# Patient Record
Sex: FEMALE | Race: WHITE | NOT HISPANIC OR LATINO | URBAN - METROPOLITAN AREA
[De-identification: names, ages, dates, MRNs, and addresses within clinical notes are randomized per-mention and may not be internally consistent; named-entity substitution may affect disease eponyms.]

---
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
## 2023-04-29 ENCOUNTER — OFFICE VISIT (OUTPATIENT)
Dept: URGENT CARE | Facility: CLINIC | Age: 23
End: 2023-04-29

## 2023-04-29 VITALS
DIASTOLIC BLOOD PRESSURE: 89 MMHG | OXYGEN SATURATION: 100 % | SYSTOLIC BLOOD PRESSURE: 132 MMHG | RESPIRATION RATE: 16 BRPM | TEMPERATURE: 98.3 F | HEART RATE: 78 BPM

## 2023-04-29 DIAGNOSIS — N30.00 ACUTE CYSTITIS WITHOUT HEMATURIA: ICD-10-CM

## 2023-04-29 DIAGNOSIS — R35.0 URINARY FREQUENCY: Primary | ICD-10-CM

## 2023-04-29 LAB
SL AMB  POCT GLUCOSE, UA: NEGATIVE
SL AMB LEUKOCYTE ESTERASE,UA: ABNORMAL
SL AMB POCT BILIRUBIN,UA: NEGATIVE
SL AMB POCT BLOOD,UA: NEGATIVE
SL AMB POCT CLARITY,UA: ABNORMAL
SL AMB POCT COLOR,UA: YELLOW
SL AMB POCT KETONES,UA: NEGATIVE
SL AMB POCT NITRITE,UA: NEGATIVE
SL AMB POCT PH,UA: 6.5
SL AMB POCT SPECIFIC GRAVITY,UA: 1
SL AMB POCT URINE PROTEIN: NEGATIVE
SL AMB POCT UROBILINOGEN: 0.2

## 2023-04-29 RX ORDER — NITROFURANTOIN 25; 75 MG/1; MG/1
100 CAPSULE ORAL 2 TIMES DAILY
Qty: 10 CAPSULE | Refills: 0 | Status: SHIPPED | OUTPATIENT
Start: 2023-04-29 | End: 2023-05-04

## 2023-04-29 NOTE — PROGRESS NOTES
[address] [facility]        NAME: Miranda Haro is a 23 y o  female  : 2000    MRN: [id number]  DATE: 2023  TIME: 10:29 AM    Assessment and Plan   Urinary frequency [R35 0]  1  Urinary frequency  POCT urine dip    Urine culture      2  Acute cystitis without hematuria  nitrofurantoin (MACROBID) 100 mg capsule            Patient Instructions     Check my chart for urine culture results  Start antibiotic and take as directed  Take probiotic  Drink plenty of fluids, water or cranberry juice  Avoid caffeine and alcohol  Tylenol or Motrin for pain or fever  Azo for bladder spasms  Follow up with PCP in 3-5 days  If you develop fever, flank pain, vomiting, abdominal pain, or any new or concerning symptoms please return or proceed to ER  Chief Complaint     Chief Complaint   Patient presents with   • Possible UTI     State for greater than 1 week  Has has intermittent episodes of increased frequency, cloudy urine, and ordor  Taking Urostat with some results as it improved the burning  History of Present Illness       The patient presents today with complaints of intermittent episodes of urinary frequency, burning, cloudy urine with odor x over 1 week  She has been taking urostat as needed with some improvement in symptoms  She denies fever/chills, flank/back pain, n/v/d, lower abdominal pain  She denies vaginal burning/itchiness, chance of pregnancy, or concern for STD exposure  Review of Systems   Review of Systems   Constitutional: Negative for chills, fatigue and fever  HENT: Negative for congestion  Eyes: Negative  Respiratory: Negative for cough and shortness of breath  Cardiovascular: Negative for chest pain and palpitations  Gastrointestinal: Negative for abdominal pain, diarrhea, nausea and vomiting  Genitourinary: Positive for dysuria and frequency   Negative for difficulty urinating, flank pain, pelvic pain, urgency, vaginal discharge and vaginal pain  Cloudy urine with odor   Musculoskeletal: Negative for back pain and myalgias  Skin: Negative for rash  Allergic/Immunologic: Negative for environmental allergies  Neurological: Negative for dizziness and headaches  Psychiatric/Behavioral: Negative  Current Medications       Current Outpatient Medications:   •  nitrofurantoin (MACROBID) 100 mg capsule, Take 1 capsule (100 mg total) by mouth 2 (two) times a day for 5 days, Disp: 10 capsule, Rfl: 0  •  Doxycycline Hyclate (DOXY-CAPS PO), Take by mouth (Patient not taking: Reported on 4/29/2023), Disp: , Rfl:     Current Allergies     Allergies as of 04/29/2023   • (No Known Allergies)            The following portions of the patient's history were reviewed and updated as appropriate: allergies, current medications, past family history, past medical history, past social history, past surgical history and problem list      Past Medical History:   Diagnosis Date   • Acne vulgaris        Past Surgical History:   Procedure Laterality Date   • NO PAST SURGERIES         Family History   Problem Relation Age of Onset   • No Known Problems Mother    • No Known Problems Father    • Multiple sclerosis Maternal Grandmother    • Heart disease Maternal Grandfather    • No Known Problems Paternal Grandmother    • No Known Problems Paternal Grandfather    • No Known Problems Sister    • Alcohol abuse Neg Hx    • Substance Abuse Neg Hx    • Mental illness Neg Hx          Medications have been verified  Objective   /89 (BP Location: Right arm, Patient Position: Sitting, Cuff Size: Standard)   Pulse 78   Temp 98 3 °F (36 8 °C) (Temporal)   Resp 16   LMP 04/10/2023 (Exact Date)   SpO2 100%        Physical Exam     Physical Exam  Vitals and nursing note reviewed  Constitutional:       General: She is not in acute distress  Appearance: Normal appearance  She is not ill-appearing  HENT:      Head: Normocephalic and atraumatic  Right Ear: External ear normal       Left Ear: External ear normal       Nose: Nose normal    Eyes:      General: Vision grossly intact  Extraocular Movements: Extraocular movements intact  Pupils: Pupils are equal, round, and reactive to light  Cardiovascular:      Rate and Rhythm: Normal rate and regular rhythm  Heart sounds: Normal heart sounds  No murmur heard  Pulmonary:      Effort: Pulmonary effort is normal  No respiratory distress  Breath sounds: Normal breath sounds  No decreased air movement  No decreased breath sounds, wheezing, rhonchi or rales  Abdominal:      General: Abdomen is flat  Bowel sounds are normal  There is no distension  Palpations: Abdomen is soft  Tenderness: There is no abdominal tenderness  Musculoskeletal:         General: Normal range of motion  Cervical back: Normal range of motion  Skin:     General: Skin is warm  Findings: No rash  Neurological:      Mental Status: She is alert and oriented to person, place, and time  Motor: Motor function is intact     Psychiatric:         Attention and Perception: Attention normal          Mood and Affect: Mood normal

## 2023-04-29 NOTE — PATIENT INSTRUCTIONS
Check my chart for urine culture results  Start antibiotic and take as directed  Take probiotic  Drink plenty of fluids, water or cranberry juice  Avoid caffeine and alcohol  Tylenol or Motrin for pain or fever  Azo for bladder spasms  Follow up with PCP in 3-5 days  If you develop fever, flank pain, vomiting, abdominal pain, or any new or concerning symptoms please return or proceed to ER  Urinary Tract Infection in Women   WHAT YOU NEED TO KNOW:   A urinary tract infection (UTI) is caused by bacteria that get inside your urinary tract  Most bacteria that enter your urinary tract come out when you urinate  If the bacteria stay in your urinary tract, you may get an infection  Your urinary tract includes your kidneys, ureters, bladder, and urethra  Urine is made in your kidneys, and it flows from the ureters to the bladder  Urine leaves the bladder through the urethra  A UTI is more common in your lower urinary tract, which includes your bladder and urethra  DISCHARGE INSTRUCTIONS:   Return to the emergency department if:   You are urinating very little or not at all  You have a high fever with shaking chills  You have side or back pain that gets worse  Contact your healthcare provider if:   You have a fever  You do not feel better after 2 days of taking antibiotics  You are vomiting  You have questions or concerns about your condition or care  Medicines:   Antibiotics  help fight a bacterial infection  Medicines  may be given to decrease pain and burning when you urinate  They will also help decrease the feeling that you need to urinate often  These medicines will make your urine orange or red  Take your medicine as directed  Contact your healthcare provider if you think your medicine is not helping or if you have side effects  Tell him or her if you are allergic to any medicine  Keep a list of the medicines, vitamins, and herbs you take   Include the amounts, and when and why you take them  Bring the list or the pill bottles to follow-up visits  Carry your medicine list with you in case of an emergency  Follow up with your healthcare provider as directed:  Write down your questions so you remember to ask them during your visits  Prevent another UTI:   Empty your bladder often  Urinate and empty your bladder as soon as you feel the need  Do not hold your urine for long periods of time  Wipe from front to back after you urinate or have a bowel movement  This will help prevent germs from getting into your urinary tract through your urethra  Drink liquids as directed  Ask how much liquid to drink each day and which liquids are best for you  You may need to drink more liquids than usual to help flush out the bacteria  Do not drink alcohol, caffeine, or citrus juices  These can irritate your bladder and increase your symptoms  Your healthcare provider may recommend cranberry juice to help prevent a UTI  Urinate after you have sex  This can help flush out bacteria passed during sex  Do not douche or use feminine deodorants  These can change the chemical balance in your vagina  Change sanitary pads or tampons often  This will help prevent germs from getting into your urinary tract  Do pelvic muscle exercises often  Pelvic muscle exercises may help you start and stop urinating  Strong pelvic muscles may help you empty your bladder easier  Squeeze these muscles tightly for 5 seconds like you are trying to hold back urine  Then relax for 5 seconds  Gradually work up to squeezing for 10 seconds  Do 3 sets of 15 repetitions a day, or as directed  © 2017 [address] Information is for End User's use only and may not be sold, redistributed or otherwise used for commercial purposes  All illustrations and images included in CareNotes® are the copyrighted property of [employer]  or Kaiser [employer]    The above information is an  only  It is not intended as medical advice for individual conditions or treatments  Talk to your doctor, nurse or pharmacist before following any medical regimen to see if it is safe and effective for you

## 2023-05-01 LAB — BACTERIA UR CULT: NORMAL

## 2023-05-31 NOTE — PROGRESS NOTES
Amie Copeland 2000 female MRN: [id number]      ASSESSMENT/PLAN  Problem List Items Addressed This Visit    None  Visit Diagnoses     Healthcare maintenance    -  Primary    Encounter for initial prescription of contraceptive pills        Relevant Medications    norgestimate-ethinyl estradiol (Sprintec 28) 0 25-35 MG-MCG per tablet    Screening for diabetes mellitus        Relevant Orders    Comprehensive metabolic panel    Screening, lipid        Relevant Orders    Lipid panel        Discussed trial of OTC allergy medication    Reviewed options for contraception including pill/ring/patch vs injectable vs Nexplanon/IUD  Pt would like to trial OCPs  Denies personal history of migraine, smoking, blood clot, liver disease, HTN  Reviewed possible ADRs including irregular bleeding, mood fluctuations, increased risk of clotting  Reviewed starting method  To call if not tolerating  Reviewed various options for management of anxiety including therapy and medication  Pt defers any services now, but is aware of in office LCSW should she like to schedule in future  BP WNL   CMP + Lipids to screen for HLD, DM   STD screening deferred per pt request   Vaccinations: HPV UTD, TDap UTD, COVID completed primary series   Encouraged regular physical activity, varied diet, and regular dental/eye exams     Future Appointments   Date Time Provider Aung Saldana   6/15/2023  9:00 AM JOHN [email or web address]   6/3/2024  9:00 AM DO YUNI Tobar  [facility]          SUBJECTIVE  CC: Establish Care      HPI:  Amie Copeland is a 23 y o  female who presents to establish care  History reviewed and updated as below  Would like to discuss contraception options   Wondering about who she could talk to about mental health -- notes anxiety, trouble focusing in school     Review of Systems   Constitutional: Negative for unexpected weight change     HENT: Positive for congestion, postnasal drip, rhinorrhea and sneezing  Negative for ear pain and sore throat          (+) allergies   Eyes: Negative for visual disturbance  Respiratory: Negative for cough and shortness of breath  Cardiovascular: Positive for palpitations (with anxiety)  Negative for chest pain and leg swelling  Gastrointestinal: Negative for abdominal pain, constipation and diarrhea  Endocrine: Negative for polyuria  Genitourinary: Negative for dysuria and menstrual problem  Neurological: Negative for dizziness and headaches  Psychiatric/Behavioral: Negative for sleep disturbance  The patient is nervous/anxious          Historical Information   The patient history was reviewed and updated as follows:    Past Medical History:   Diagnosis Date   • Acne vulgaris      Past Surgical History:   Procedure Laterality Date   • NO PAST SURGERIES       Family History   Problem Relation Age of Onset   • No Known Problems Mother    • No Known Problems Father    • Multiple sclerosis Maternal Grandmother    • Heart disease Maternal Grandfather    • No Known Problems Paternal Grandmother    • No Known Problems Paternal Grandfather    • No Known Problems Sister    • Alcohol abuse Neg Hx    • Substance Abuse Neg Hx    • Mental illness Neg Hx       Social History   Social History     Substance and Sexual Activity   Alcohol Use No     Social History     Substance and Sexual Activity   Drug Use No     Social History     Tobacco Use   Smoking Status Never   Smokeless Tobacco Never       Medications:     Current Outpatient Medications:   •  Multiple Vitamin (multivitamin) tablet, Take 1 tablet by mouth daily, Disp: , Rfl:   •  norgestimate-ethinyl estradiol (Sprintec 28) 0 25-35 MG-MCG per tablet, Take 1 tablet by mouth daily, Disp: 30 tablet, Rfl: 11  No Known Allergies    OBJECTIVE    Vitals:   Vitals:    06/01/23 0900 06/01/23 0925   BP: 136/86 122/76   Pulse: 88    Temp: (!) 97 1 °F (36 2 °C)    SpO2: 100%    Weight: 77 6 kg (171 lb)    Height: 5' "8\" (1 727 m)            Physical Exam  Vitals and nursing note reviewed  Constitutional:       General: She is not in acute distress  Appearance: Normal appearance  HENT:      Head: Normocephalic and atraumatic  Right Ear: Tympanic membrane, ear canal and external ear normal       Left Ear: Tympanic membrane, ear canal and external ear normal       Nose: Nose normal       Mouth/Throat:      Mouth: Mucous membranes are moist       Pharynx: No oropharyngeal exudate or posterior oropharyngeal erythema  Eyes:      Conjunctiva/sclera: Conjunctivae normal    Cardiovascular:      Rate and Rhythm: Normal rate and regular rhythm  Pulmonary:      Effort: Pulmonary effort is normal  No respiratory distress  Breath sounds: Normal breath sounds  Abdominal:      General: Bowel sounds are normal  There is no distension  Palpations: Abdomen is soft  Tenderness: There is no abdominal tenderness  Musculoskeletal:      Right lower leg: No edema  Left lower leg: No edema  Lymphadenopathy:      Cervical: No cervical adenopathy  Skin:     General: Skin is warm and dry  Neurological:      General: No focal deficit present  Mental Status: She is alert     Psychiatric:         Mood and Affect: Mood normal                     DO Aiyana R[address]as [address] [facility]   6/1/2023  9:32 AM    "

## 2023-06-01 ENCOUNTER — APPOINTMENT (OUTPATIENT)
Dept: LAB | Facility: CLINIC | Age: 23
End: 2023-06-01
Payer: COMMERCIAL

## 2023-06-01 ENCOUNTER — OFFICE VISIT (OUTPATIENT)
Dept: FAMILY MEDICINE CLINIC | Facility: CLINIC | Age: 23
End: 2023-06-01

## 2023-06-01 VITALS
HEIGHT: 68 IN | WEIGHT: 171 LBS | BODY MASS INDEX: 25.91 KG/M2 | TEMPERATURE: 97.1 F | OXYGEN SATURATION: 100 % | SYSTOLIC BLOOD PRESSURE: 122 MMHG | DIASTOLIC BLOOD PRESSURE: 76 MMHG | HEART RATE: 88 BPM

## 2023-06-01 DIAGNOSIS — Z13.1 SCREENING FOR DIABETES MELLITUS: ICD-10-CM

## 2023-06-01 DIAGNOSIS — Z13.220 SCREENING, LIPID: ICD-10-CM

## 2023-06-01 DIAGNOSIS — Z30.011 ENCOUNTER FOR INITIAL PRESCRIPTION OF CONTRACEPTIVE PILLS: ICD-10-CM

## 2023-06-01 DIAGNOSIS — Z00.00 HEALTHCARE MAINTENANCE: Primary | ICD-10-CM

## 2023-06-01 LAB
ALBUMIN SERPL BCP-MCNC: 4 G/DL (ref 3.5–5)
ALP SERPL-CCNC: 50 U/L (ref 46–116)
ALT SERPL W P-5'-P-CCNC: 18 U/L (ref 12–78)
ANION GAP SERPL CALCULATED.3IONS-SCNC: 0 MMOL/L (ref 4–13)
AST SERPL W P-5'-P-CCNC: 10 U/L (ref 5–45)
BILIRUB SERPL-MCNC: 0.36 MG/DL (ref 0.2–1)
BUN SERPL-MCNC: 10 MG/DL (ref 5–25)
CALCIUM SERPL-MCNC: 9.4 MG/DL (ref 8.3–10.1)
CHLORIDE SERPL-SCNC: 111 MMOL/L (ref 96–108)
CHOLEST SERPL-MCNC: 117 MG/DL
CO2 SERPL-SCNC: 25 MMOL/L (ref 21–32)
CREAT SERPL-MCNC: 0.99 MG/DL (ref 0.6–1.3)
GFR SERPL CREATININE-BSD FRML MDRD: 82 ML/MIN/1.73SQ M
GLUCOSE P FAST SERPL-MCNC: 89 MG/DL (ref 65–99)
HDLC SERPL-MCNC: 71 MG/DL
LDLC SERPL CALC-MCNC: 38 MG/DL (ref 0–100)
NONHDLC SERPL-MCNC: 46 MG/DL
POTASSIUM SERPL-SCNC: 4.2 MMOL/L (ref 3.5–5.3)
PROT SERPL-MCNC: 7.7 G/DL (ref 6.4–8.4)
SODIUM SERPL-SCNC: 136 MMOL/L (ref 135–147)
TRIGL SERPL-MCNC: 39 MG/DL

## 2023-06-01 PROCEDURE — 36415 COLL VENOUS BLD VENIPUNCTURE: CPT

## 2023-06-01 PROCEDURE — 80053 COMPREHEN METABOLIC PANEL: CPT

## 2023-06-01 PROCEDURE — 80061 LIPID PANEL: CPT

## 2023-06-01 RX ORDER — NORGESTIMATE AND ETHINYL ESTRADIOL 0.25-0.035
1 KIT ORAL DAILY
Qty: 30 TABLET | Refills: 11 | Status: SHIPPED | OUTPATIENT
Start: 2023-06-01

## 2023-06-01 RX ORDER — MULTIVITAMIN
1 TABLET ORAL DAILY
COMMUNITY

## 2023-06-15 ENCOUNTER — OFFICE VISIT (OUTPATIENT)
Age: 23
End: 2023-06-15
Payer: COMMERCIAL

## 2023-06-15 VITALS
SYSTOLIC BLOOD PRESSURE: 118 MMHG | HEIGHT: 68 IN | DIASTOLIC BLOOD PRESSURE: 78 MMHG | BODY MASS INDEX: 26.07 KG/M2 | WEIGHT: 172 LBS

## 2023-06-15 DIAGNOSIS — Z01.419 ENCOUNTER FOR GYNECOLOGICAL EXAMINATION WITHOUT ABNORMAL FINDING: Primary | ICD-10-CM

## 2023-06-15 DIAGNOSIS — Z11.3 SCREENING FOR STDS (SEXUALLY TRANSMITTED DISEASES): ICD-10-CM

## 2023-06-15 DIAGNOSIS — Z30.41 ENCOUNTER FOR SURVEILLANCE OF CONTRACEPTIVE PILLS: ICD-10-CM

## 2023-06-15 PROCEDURE — 87491 CHLMYD TRACH DNA AMP PROBE: CPT | Performed by: NURSE PRACTITIONER

## 2023-06-15 PROCEDURE — 99385 PREV VISIT NEW AGE 18-39: CPT | Performed by: NURSE PRACTITIONER

## 2023-06-15 PROCEDURE — 87591 N.GONORRHOEAE DNA AMP PROB: CPT | Performed by: NURSE PRACTITIONER

## 2023-06-15 RX ORDER — NORGESTIMATE AND ETHINYL ESTRADIOL 0.25-0.035
1 KIT ORAL DAILY
Qty: 30 TABLET | Refills: 11 | Status: SHIPPED | OUTPATIENT
Start: 2023-06-15

## 2023-06-15 NOTE — PROGRESS NOTES
Delmy was seen today for gynecologic exam     Diagnoses and all orders for this visit:    Encounter for gynecological examination without abnormal finding    Encounter for surveillance of contraceptive pills  -     norgestimate-ethinyl estradiol (Sprintec 28) 0 25-35 MG-MCG per tablet; Take 1 tablet by mouth daily    Screening for STDs (sexually transmitted diseases)  -     Chlamydia/GC amplified DNA by PCR      Calcium/vit d inclusion in the diet discussed, call with any issues, SBE reinforced, all concerns addressed  Encouraged safe sex  Pleasant 23 y o  NP premenopausal female here for annual exam  She denies any issues with bleeding or her menses  She reports regular cycles that last a few days  She just started ocp a few weeks ago for pregnancy prevention  GC/CT done today  She denies vaginal issues  Denies pelvic pain  She is doing well on her [facility]  All questions were addressed  Discussed ACHES  Gardasil series completed  Sexually active without any concerns      Past Medical History:   Diagnosis Date   • Acne vulgaris      Past Surgical History:   Procedure Laterality Date   • NO PAST SURGERIES       Family History   Problem Relation Age of Onset   • No Known Problems Mother    • No Known Problems Father    • Multiple sclerosis Maternal Grandmother    • Diabetes Maternal Grandmother    • Heart disease Maternal Grandfather    • No Known Problems Paternal Grandmother    • No Known Problems Paternal Grandfather    • No Known Problems Sister    • Alcohol abuse Neg Hx    • Substance Abuse Neg Hx    • Mental illness Neg Hx      Social History     Tobacco Use   • Smoking status: Never   • Smokeless tobacco: Never   Vaping Use   • Vaping Use: Never used   Substance Use Topics   • Alcohol use: No   • Drug use: No       Current Outpatient Medications:   •  norgestimate-ethinyl estradiol (Sprintec 28) 0 25-35 MG-MCG per tablet, Take 1 tablet by mouth daily, Disp: 30 tablet, Rfl: 11  •  Multiple Vitamin "(multivitamin) tablet, Take 1 tablet by mouth daily, Disp: , Rfl:   Patient Active Problem List    Diagnosis Date Noted   • Surveillance of contraceptive pill 06/15/2023       No Known Allergies    OB History    Para Term  AB Living   0 0 0 0 0 0   SAB IAB Ectopic Multiple Live Births   0 0 0 0 0     At [facility] [employer] [facility], trying for dental hygienist  Also works in a [employer] as a [address]:    06/15/23 0903   BP: 118/78   Weight: 78 kg (172 lb)   Height: 5' 8\" (1 727 m)     Body mass index is 26 15 kg/m²  Review of Systems   Constitutional: Negative for chills, fatigue, fever and unexpected weight change  Respiratory: Negative for shortness of breath  Gastrointestinal: Negative for anal bleeding, blood in stool, constipation and diarrhea  Genitourinary: Negative for difficulty urinating, dysuria and hematuria  Physical Exam   Constitutional: She appears well-developed and well-nourished  No distress  HENT: normal, no gross abnormalities noted  Head: Normocephalic  Neck: Normal range of motion  Neck supple  Pulmonary: Effort normal   Breasts: bilateral without masses, skin changes or nipple discharge  Bilaterally soft and warm to touch  No areas of erythema or pain  Abdominal: Soft  Pelvic exam was performed with patient supine  No labial fusion  There is no rash, tenderness, lesion or injury on the right labia  There is no rash, tenderness, lesion or injury on the left labia  Uterus is not deviated, not enlarged, not fixed and not tender  Cervix exhibits no motion tenderness, no discharge and no friability  Right adnexum displays no mass, no tenderness and no fullness  Left adnexum displays no mass, no tenderness and no fullness  No erythema or tenderness in the vagina  No foreign body in the vagina  No signs of injury around the vagina  No vaginal discharge found  Lymphadenopathy:        Right: No inguinal adenopathy present          Left: No inguinal " adenopathy present

## 2023-06-15 NOTE — PATIENT INSTRUCTIONS
Birth Control Pills   WHAT YOU NEED TO KNOW:   What are birth control pills? Birth control pills are also called oral contraceptives, or the pill  It is medicine that helps prevent pregnancy by stopping ovulation  Ovulation is when the ovaries make and release an egg cell each month  If this egg gets fertilized by sperm, pregnancy occurs  You will need to take the pill at the same time every day  Your healthcare provider will tell you when to start taking the pill  You will also be told what to do if you miss a dose  Instructions will depend on the kind of birth control pills you are taking  What are the different kinds of birth control pills? Some kinds are taken for 21 days in a row, followed by 7 days of placebo (no hormones) pills  Other kinds are taken for 24 days followed by 4 days of placebos  Each kind has a certain amount of female hormones  Your provider will decide on the kind that is best for you based on your age and other health conditions  What may be done before I can start taking birth control pills? You need to see your healthcare provider to get a prescription  Any of the following may be done before your healthcare provider gives you a prescription:  Your healthcare provider will ask about diseases and illnesses you have had in the past  Your provider will check your risk for blood clots, heart conditions, or stroke  Tell your provider if you had gastric bypass surgery  This surgery can affect the way your body absorbs medicines such as birth control pills  Your provider will also check your blood pressure, and may do a breast and pelvic exam  A Pap smear may also be done during the pelvic exam  This is a test to make sure you do not have abnormal changes on your cervix  You may need other tests, such as a urine test to make sure you are not pregnant  Your provider will ask if you take any medicines and if you smoke   Smoking increases your risk for stroke, heart attack, or a blood clot in your lungs  If you smoke, you should not take certain kinds of birth control pills  What are the advantages of birth control pills? When birth control pills are used correctly, the chances of getting pregnant are very low  Birth control pills may help decrease bleeding and pain during your monthly period  They may also help prevent cancer of the uterus and ovaries  What are the disadvantages of birth control pills? You may have sudden changes in your mood or feelings while you take birth control pills  You may have nausea and a decreased sex drive  You may have an increased appetite and rapid weight gain  You may also have bleeding in between periods, less frequent periods, vaginal dryness, and breast pain  Birth control pills will not protect you from sexually transmitted infections  Rarely, some birth control pills can increase your risk for a blood clot  This may become life-threatening  What should I do if I decide I want to get pregnant? If you are planning to have a baby, ask your healthcare provider when you may stop taking your birth control pills  It may take some time for you to start ovulating again  Ask your healthcare provider for more information about pregnancy after birth control pills  When should I start taking birth control pills after I have a baby? If you are not breastfeeding, you may start taking birth control pills 3 weeks after you give birth  You may be able to take certain types of birth control pills if you are breastfeeding  These pills can be started from 6 weeks to 6 months after you give birth  Ask your healthcare provider for more information about when to start taking birth control pills after you give birth  What do I need to know about birth control pills and menopause? Talk with your healthcare provider if you want to take birth control pills around menopause  Around age 39, you will enter into perimenopause   This means your hormone levels are dropping and you are ovulating less often  You can still become pregnant during this time  The risk for problems, such as miscarriage, are higher if you become pregnant after age 39  Birth control pills will prevent pregnancy, and may also help prevent or relieve some signs and symptoms of menopause  Examples are hot flashes and mood swings  Your provider will do tests when you are around age 48  The tests may show that you are in menopause  If the tests do not show menopause for sure, you may be able to continue taking the pill up to age 54  The decision will depend on your health and if you have any medical conditions, such as a blood clot  Call your local emergency number (911 in the [address]) for any of the following: You have any of the following signs of a stroke:      Numbness or drooping on one side of your face     Weakness in an arm or leg    Confusion or difficulty speaking    Dizziness, a severe headache, or vision loss    You feel lightheaded, short of breath, and have chest pain  You cough up blood  When should I seek immediate care? Your arm or leg feels warm, tender, and painful  It may look swollen and red  You have severe pain, numbness, or swelling in your arms or legs  When should I call my doctor? You have forgotten to take a birth control pill  You have mood changes, such as depression, since starting birth control pills  You have nausea or are vomiting  You have severe abdominal pain  You missed a period and have questions or concerns about being pregnant  You still have bleeding 4 months after taking birth control pills correctly  You have questions or concerns about your condition or care  CARE AGREEMENT:   You have the right to help plan your care  Learn about your health condition and how it may be treated  Discuss treatment options with your healthcare providers to decide what care you want to receive  You always have the right to refuse treatment   The above information is an  only  It is not intended as medical advice for individual conditions or treatments  Talk to your doctor, nurse or pharmacist before following any medical regimen to see if it is safe and effective for you  © Copyright [employer]ek[employer] 2022 Information is for End User's use only and may not be sold, redistributed or otherwise used for commercial purposes  Breast Self Exam for Women   AMBULATORY CARE:   A breast self-exam (BSE)  is a way to check your breasts for lumps and other changes  Regular BSEs can help you know how your breasts normally look and feel  Most breast lumps or changes are not cancer, but you should always have them checked by a healthcare provider  Why you should do a BSE:  Breast cancer is the most common type of cancer in women  Even if you have mammograms, you may still want to do a BSE regularly  If you know how your breasts normally feel and look, it may help you know when to contact your healthcare provider  Mammograms can miss some cancers  You may find a lump during a BSE that did not show up on a mammogram   When you should do a BSE:  If you have periods, you may want to do your BSE 1 week after your period ends  This is the time when your breasts may be the least swollen, lumpy, or tender  You can do regular BSEs even if you are breastfeeding or have breast implants  Call your doctor if:   You find any lumps or changes in your breasts  You have breast pain or fluid coming from your nipples  You have questions or concerns about your condition or care  How to do a BSE:       Look at your breasts in a mirror  Look at the size and shape of each breast and nipple  Check for swelling, lumps, dimpling, scaly skin, or other skin changes  Look for nipple changes, such as a nipple that is painful or beginning to pull inward  Gently squeeze both nipples and check to see if fluid (that is not breast milk) comes out of them   If you find any of these or other breast changes, contact your healthcare provider  Check your breasts while you sit or  the following 3 positions:    Benedict your arms down at your sides  Raise your hands and join them behind your head  Put firm pressure with your hands on your hips  Bend slightly forward while you look at your breasts in the mirror  Lie down and feel your breasts  When you lie down, your breast tissue spreads out evenly over your chest  This makes it easier for you to feel for lumps and anything that may not be normal for your breasts  Do a BSE on one breast at a time  Place a small pillow or towel under your left shoulder  Put your left arm behind your head  Use the 3 middle fingers of your right hand  Use your fingertip pads, on the top of your fingers  Your fingertip pad is the most sensitive part of your finger  Use small circles to feel your breast tissue  Use your fingertip pads to make dime-sized, overlapping circles on your breast and armpits  Use light, medium, and firm pressure  First, press lightly  Second, press with medium pressure to feel a little deeper into the breast  Last, use firm pressure to feel deep within your breast     Examine your entire breast area  Examine the breast area from above the breast to below the breast where you feel only ribs  Make small circles with your fingertips, starting in the middle of your armpit  Make circles going up and down the breast area  Continue toward your breast and all the way across it  Examine the area from your armpit all the way over to the middle of your chest (breastbone)  Stop at the middle of your chest     Move the pillow or towel to your right shoulder, and put your right arm behind your head  Use the 3 fingertip pads of your left hand, and repeat the above steps to do a BSE on your right breast   What else you can do to check for breast problems or cancer:  Talk to your healthcare provider about mammograms   A mammogram is an x-ray of your breasts to screen for breast cancer or other problems  Your provider can tell you the benefits and risks of mammograms  The first mammogram is usually at age 39 or 48  Your provider may recommend you start at 36 or younger if your risk for breast cancer is high  Mammograms usually continue every 1 to 2 years until age 76  Follow up with your doctor as directed:  Write down your questions so you remember to ask them during your visits  © Nickolas Cardenas 2022 Information is for End User's use only and may not be sold, redistributed or otherwise used for commercial purposes  The above information is an  only  It is not intended as medical advice for individual conditions or treatments  Talk to your doctor, nurse or pharmacist before following any medical regimen to see if it is safe and effective for you

## 2023-06-19 LAB
C TRACH DNA SPEC QL NAA+PROBE: NEGATIVE
N GONORRHOEA DNA SPEC QL NAA+PROBE: NEGATIVE

## 2023-08-29 ENCOUNTER — PATIENT MESSAGE (OUTPATIENT)
Age: 23
End: 2023-08-29

## 2024-01-01 ENCOUNTER — ANESTHESIA EVENT (INPATIENT)
Dept: PERIOP | Facility: HOSPITAL | Age: 24
DRG: 853 | End: 2024-01-01
Payer: COMMERCIAL

## 2024-01-01 ENCOUNTER — PREP FOR PROCEDURE (OUTPATIENT)
Dept: UROLOGY | Facility: AMBULATORY SURGERY CENTER | Age: 24
End: 2024-01-01

## 2024-01-01 ENCOUNTER — APPOINTMENT (EMERGENCY)
Dept: CT IMAGING | Facility: HOSPITAL | Age: 24
End: 2024-01-01
Payer: COMMERCIAL

## 2024-01-01 ENCOUNTER — ANESTHESIA (INPATIENT)
Dept: PERIOP | Facility: HOSPITAL | Age: 24
DRG: 853 | End: 2024-01-01
Payer: COMMERCIAL

## 2024-01-01 ENCOUNTER — HOSPITAL ENCOUNTER (EMERGENCY)
Facility: HOSPITAL | Age: 24
End: 2024-01-01
Attending: EMERGENCY MEDICINE
Payer: COMMERCIAL

## 2024-01-01 ENCOUNTER — HOSPITAL ENCOUNTER (INPATIENT)
Dept: RADIOLOGY | Facility: HOSPITAL | Age: 24
Discharge: HOME | DRG: 853 | End: 2024-01-01
Payer: COMMERCIAL

## 2024-01-01 ENCOUNTER — HOSPITAL ENCOUNTER (INPATIENT)
Facility: HOSPITAL | Age: 24
LOS: 3 days | Discharge: HOME | DRG: 853 | End: 2024-01-04
Attending: INTERNAL MEDICINE | Admitting: INTERNAL MEDICINE
Payer: COMMERCIAL

## 2024-01-01 VITALS
DIASTOLIC BLOOD PRESSURE: 56 MMHG | HEART RATE: 130 BPM | TEMPERATURE: 103.2 F | OXYGEN SATURATION: 95 % | SYSTOLIC BLOOD PRESSURE: 108 MMHG | RESPIRATION RATE: 22 BRPM

## 2024-01-01 DIAGNOSIS — A41.9 SEPTIC SHOCK (HCC): Primary | ICD-10-CM

## 2024-01-01 DIAGNOSIS — N20.0 KIDNEY STONE: ICD-10-CM

## 2024-01-01 DIAGNOSIS — N20.1 URETERAL STONE: ICD-10-CM

## 2024-01-01 DIAGNOSIS — D69.6 THROMBOCYTOPENIA (HCC): ICD-10-CM

## 2024-01-01 DIAGNOSIS — N20.1 RIGHT URETERAL STONE: Primary | ICD-10-CM

## 2024-01-01 DIAGNOSIS — N20.1 URETERAL STONE: Primary | ICD-10-CM

## 2024-01-01 DIAGNOSIS — N20.0 NEPHROLITHIASIS: ICD-10-CM

## 2024-01-01 DIAGNOSIS — A41.9 SEPSIS (HCC): ICD-10-CM

## 2024-01-01 DIAGNOSIS — R65.21 SEPTIC SHOCK (HCC): Primary | ICD-10-CM

## 2024-01-01 LAB
ALBUMIN SERPL BCP-MCNC: 4.7 G/DL (ref 3.5–5)
ALP SERPL-CCNC: 43 U/L (ref 34–104)
ALT SERPL W P-5'-P-CCNC: 14 U/L (ref 7–52)
ANION GAP SERPL CALCULATED.3IONS-SCNC: 13 MMOL/L
AST SERPL W P-5'-P-CCNC: 16 U/L (ref 13–39)
BACTERIA UR QL AUTO: ABNORMAL /HPF
BASOPHILS # BLD AUTO: 0.03 THOUSANDS/ÂΜL (ref 0–0.1)
BASOPHILS NFR BLD AUTO: 0 % (ref 0–1)
BILIRUB SERPL-MCNC: 1.23 MG/DL (ref 0.2–1)
BILIRUB UR QL STRIP: NEGATIVE
BUN SERPL-MCNC: 17 MG/DL (ref 5–25)
CALCIUM SERPL-MCNC: 9.4 MG/DL (ref 8.4–10.2)
CHLORIDE SERPL-SCNC: 105 MMOL/L (ref 96–108)
CLARITY UR: CLEAR
CO2 SERPL-SCNC: 21 MMOL/L (ref 21–32)
COLOR UR: ABNORMAL
CREAT SERPL-MCNC: 1.47 MG/DL (ref 0.6–1.3)
EOSINOPHIL # BLD AUTO: 0.01 THOUSAND/ÂΜL (ref 0–0.61)
EOSINOPHIL NFR BLD AUTO: 0 % (ref 0–6)
ERYTHROCYTE [DISTWIDTH] IN BLOOD BY AUTOMATED COUNT: 12.8 % (ref 11.6–15.1)
FLUAV RNA RESP QL NAA+PROBE: NEGATIVE
FLUBV RNA RESP QL NAA+PROBE: NEGATIVE
GFR SERPL CREATININE-BSD FRML MDRD: 50 ML/MIN/1.73SQ M
GLUCOSE SERPL-MCNC: 140 MG/DL (ref 65–140)
GLUCOSE UR STRIP-MCNC: NEGATIVE MG/DL
HCG SERPL QL: NEGATIVE
HCT VFR BLD AUTO: 40.9 % (ref 34.8–46.1)
HGB BLD-MCNC: 13.7 G/DL (ref 11.5–15.4)
HGB UR QL STRIP.AUTO: ABNORMAL
IMM GRANULOCYTES # BLD AUTO: 0.03 THOUSAND/UL (ref 0–0.2)
IMM GRANULOCYTES NFR BLD AUTO: 0 % (ref 0–2)
KETONES UR STRIP-MCNC: ABNORMAL MG/DL
LACTATE SERPL-SCNC: 1.6 MMOL/L (ref 0.5–2)
LACTATE SERPL-SCNC: 3.2 MMOL/L (ref 0.5–2)
LEUKOCYTE ESTERASE UR QL STRIP: ABNORMAL
LIPASE SERPL-CCNC: 6 U/L (ref 11–82)
LYMPHOCYTES # BLD AUTO: 0.38 THOUSANDS/ÂΜL (ref 0.6–4.47)
LYMPHOCYTES NFR BLD AUTO: 4 % (ref 14–44)
MCH RBC QN AUTO: 28.4 PG (ref 26.8–34.3)
MCHC RBC AUTO-ENTMCNC: 33.5 G/DL (ref 31.4–37.4)
MCV RBC AUTO: 85 FL (ref 82–98)
MONOCYTES # BLD AUTO: 0.06 THOUSAND/ÂΜL (ref 0.17–1.22)
MONOCYTES NFR BLD AUTO: 1 % (ref 4–12)
NEUTROPHILS # BLD AUTO: 10.17 THOUSANDS/ÂΜL (ref 1.85–7.62)
NEUTS SEG NFR BLD AUTO: 95 % (ref 43–75)
NITRITE UR QL STRIP: POSITIVE
NON-SQ EPI CELLS URNS QL MICRO: ABNORMAL /HPF
NRBC BLD AUTO-RTO: 0 /100 WBCS
PH UR STRIP.AUTO: 6 [PH]
PLATELET # BLD AUTO: 143 THOUSANDS/UL (ref 149–390)
PMV BLD AUTO: 11.7 FL (ref 8.9–12.7)
POTASSIUM SERPL-SCNC: 4.1 MMOL/L (ref 3.5–5.3)
PROT SERPL-MCNC: 7.7 G/DL (ref 6.4–8.4)
PROT UR STRIP-MCNC: ABNORMAL MG/DL
RBC # BLD AUTO: 4.82 MILLION/UL (ref 3.81–5.12)
RBC #/AREA URNS AUTO: ABNORMAL /HPF
RSV RNA RESP QL NAA+PROBE: NEGATIVE
SARS-COV-2 RNA RESP QL NAA+PROBE: NEGATIVE
SODIUM SERPL-SCNC: 139 MMOL/L (ref 135–147)
SP GR UR STRIP.AUTO: 1.05 (ref 1–1.03)
UROBILINOGEN UR STRIP-ACNC: <2 MG/DL
WBC # BLD AUTO: 10.68 THOUSAND/UL (ref 4.31–10.16)
WBC #/AREA URNS AUTO: ABNORMAL /HPF

## 2024-01-01 PROCEDURE — 36415 COLL VENOUS BLD VENIPUNCTURE: CPT | Performed by: EMERGENCY MEDICINE

## 2024-01-01 PROCEDURE — 87186 SC STD MICRODIL/AGAR DIL: CPT | Performed by: UROLOGY

## 2024-01-01 PROCEDURE — 87186 SC STD MICRODIL/AGAR DIL: CPT | Performed by: EMERGENCY MEDICINE

## 2024-01-01 PROCEDURE — 0241U HB NFCT DS VIR RESP RNA 4 TRGT: CPT | Performed by: EMERGENCY MEDICINE

## 2024-01-01 PROCEDURE — 96365 THER/PROPH/DIAG IV INF INIT: CPT

## 2024-01-01 PROCEDURE — 74420 UROGRAPHY RTRGR +-KUB: CPT

## 2024-01-01 PROCEDURE — 0T768DZ DILATION OF RIGHT URETER WITH INTRALUMINAL DEVICE, VIA NATURAL OR ARTIFICIAL OPENING ENDOSCOPIC: ICD-10-PCS | Performed by: UROLOGY

## 2024-01-01 PROCEDURE — 83690 ASSAY OF LIPASE: CPT | Performed by: EMERGENCY MEDICINE

## 2024-01-01 PROCEDURE — 93005 ELECTROCARDIOGRAM TRACING: CPT

## 2024-01-01 PROCEDURE — 96376 TX/PRO/DX INJ SAME DRUG ADON: CPT

## 2024-01-01 PROCEDURE — 83605 ASSAY OF LACTIC ACID: CPT | Performed by: EMERGENCY MEDICINE

## 2024-01-01 PROCEDURE — 81001 URINALYSIS AUTO W/SCOPE: CPT | Performed by: EMERGENCY MEDICINE

## 2024-01-01 PROCEDURE — 52332 CYSTOSCOPY AND TREATMENT: CPT | Performed by: UROLOGY

## 2024-01-01 PROCEDURE — 80053 COMPREHEN METABOLIC PANEL: CPT | Performed by: EMERGENCY MEDICINE

## 2024-01-01 PROCEDURE — 99284 EMERGENCY DEPT VISIT MOD MDM: CPT

## 2024-01-01 PROCEDURE — 87154 CUL TYP ID BLD PTHGN 6+ TRGT: CPT | Performed by: EMERGENCY MEDICINE

## 2024-01-01 PROCEDURE — 96361 HYDRATE IV INFUSION ADD-ON: CPT

## 2024-01-01 PROCEDURE — 87040 BLOOD CULTURE FOR BACTERIA: CPT | Performed by: EMERGENCY MEDICINE

## 2024-01-01 PROCEDURE — C1758 CATHETER, URETERAL: HCPCS | Performed by: UROLOGY

## 2024-01-01 PROCEDURE — 99291 CRITICAL CARE FIRST HOUR: CPT | Performed by: EMERGENCY MEDICINE

## 2024-01-01 PROCEDURE — 87077 CULTURE AEROBIC IDENTIFY: CPT | Performed by: EMERGENCY MEDICINE

## 2024-01-01 PROCEDURE — G1004 CDSM NDSC: HCPCS

## 2024-01-01 PROCEDURE — 96375 TX/PRO/DX INJ NEW DRUG ADDON: CPT

## 2024-01-01 PROCEDURE — 87086 URINE CULTURE/COLONY COUNT: CPT | Performed by: UROLOGY

## 2024-01-01 PROCEDURE — 99291 CRITICAL CARE FIRST HOUR: CPT | Performed by: INTERNAL MEDICINE

## 2024-01-01 PROCEDURE — 74176 CT ABD & PELVIS W/O CONTRAST: CPT

## 2024-01-01 PROCEDURE — 84703 CHORIONIC GONADOTROPIN ASSAY: CPT | Performed by: EMERGENCY MEDICINE

## 2024-01-01 PROCEDURE — 96367 TX/PROPH/DG ADDL SEQ IV INF: CPT

## 2024-01-01 PROCEDURE — C1769 GUIDE WIRE: HCPCS | Performed by: UROLOGY

## 2024-01-01 PROCEDURE — 74177 CT ABD & PELVIS W/CONTRAST: CPT

## 2024-01-01 PROCEDURE — 87077 CULTURE AEROBIC IDENTIFY: CPT | Performed by: UROLOGY

## 2024-01-01 PROCEDURE — 87086 URINE CULTURE/COLONY COUNT: CPT | Performed by: EMERGENCY MEDICINE

## 2024-01-01 PROCEDURE — C2617 STENT, NON-COR, TEM W/O DEL: HCPCS | Performed by: UROLOGY

## 2024-01-01 PROCEDURE — 85025 COMPLETE CBC W/AUTO DIFF WBC: CPT | Performed by: EMERGENCY MEDICINE

## 2024-01-01 DEVICE — STENT URETERAL 6FR 24CM INLAY OPTIMA: Type: IMPLANTABLE DEVICE | Status: FUNCTIONAL

## 2024-01-01 RX ORDER — CEFAZOLIN SODIUM 2 G/50ML
2000 SOLUTION INTRAVENOUS ONCE
OUTPATIENT
Start: 2024-01-01 | End: 2024-01-01

## 2024-01-01 RX ORDER — CEFAZOLIN SODIUM 2 G/50ML
2000 SOLUTION INTRAVENOUS
Status: CANCELLED | OUTPATIENT
Start: 2024-01-02 | End: 2024-01-03

## 2024-01-01 RX ORDER — FENTANYL CITRATE/PF 50 MCG/ML
50 SYRINGE (ML) INJECTION
Status: DISCONTINUED | OUTPATIENT
Start: 2024-01-01 | End: 2024-01-01 | Stop reason: HOSPADM

## 2024-01-01 RX ORDER — FENTANYL CITRATE 50 UG/ML
INJECTION, SOLUTION INTRAMUSCULAR; INTRAVENOUS
Status: COMPLETED
Start: 2024-01-01 | End: 2024-01-01

## 2024-01-01 RX ORDER — ONDANSETRON 2 MG/ML
4 INJECTION INTRAMUSCULAR; INTRAVENOUS ONCE AS NEEDED
Status: DISCONTINUED | OUTPATIENT
Start: 2024-01-01 | End: 2024-01-01 | Stop reason: HOSPADM

## 2024-01-01 RX ORDER — SODIUM CHLORIDE 9 MG/ML
INJECTION, SOLUTION INTRAVENOUS CONTINUOUS PRN
Status: DISCONTINUED | OUTPATIENT
Start: 2024-01-01 | End: 2024-01-01

## 2024-01-01 RX ORDER — CHLORHEXIDINE GLUCONATE ORAL RINSE 1.2 MG/ML
15 SOLUTION DENTAL EVERY 12 HOURS SCHEDULED
Status: DISCONTINUED | OUTPATIENT
Start: 2024-01-01 | End: 2024-01-02

## 2024-01-01 RX ORDER — MORPHINE SULFATE 4 MG/ML
4 INJECTION, SOLUTION INTRAMUSCULAR; INTRAVENOUS ONCE
Status: COMPLETED | OUTPATIENT
Start: 2024-01-01 | End: 2024-01-01

## 2024-01-01 RX ORDER — DEXAMETHASONE SODIUM PHOSPHATE 10 MG/ML
INJECTION, SOLUTION INTRAMUSCULAR; INTRAVENOUS AS NEEDED
Status: DISCONTINUED | OUTPATIENT
Start: 2024-01-01 | End: 2024-01-01

## 2024-01-01 RX ORDER — CEFTRIAXONE 1 G/50ML
1000 INJECTION, SOLUTION INTRAVENOUS ONCE
Status: COMPLETED | OUTPATIENT
Start: 2024-01-01 | End: 2024-01-01

## 2024-01-01 RX ORDER — CEFEPIME HYDROCHLORIDE 2 G/50ML
2000 INJECTION, SOLUTION INTRAVENOUS ONCE
Status: COMPLETED | OUTPATIENT
Start: 2024-01-01 | End: 2024-01-01

## 2024-01-01 RX ORDER — SODIUM CHLORIDE, SODIUM GLUCONATE, SODIUM ACETATE, POTASSIUM CHLORIDE, MAGNESIUM CHLORIDE, SODIUM PHOSPHATE, DIBASIC, AND POTASSIUM PHOSPHATE .53; .5; .37; .037; .03; .012; .00082 G/100ML; G/100ML; G/100ML; G/100ML; G/100ML; G/100ML; G/100ML
1000 INJECTION, SOLUTION INTRAVENOUS ONCE
Status: COMPLETED | OUTPATIENT
Start: 2024-01-01 | End: 2024-01-01

## 2024-01-01 RX ORDER — HYDROMORPHONE HCL/PF 1 MG/ML
0.5 SYRINGE (ML) INJECTION
Status: DISCONTINUED | OUTPATIENT
Start: 2024-01-01 | End: 2024-01-01 | Stop reason: HOSPADM

## 2024-01-01 RX ORDER — SODIUM CHLORIDE 9 MG/ML
125 INJECTION, SOLUTION INTRAVENOUS CONTINUOUS
Status: DISCONTINUED | OUTPATIENT
Start: 2024-01-01 | End: 2024-01-02

## 2024-01-01 RX ORDER — FENTANYL CITRATE 50 UG/ML
INJECTION, SOLUTION INTRAMUSCULAR; INTRAVENOUS AS NEEDED
Status: DISCONTINUED | OUTPATIENT
Start: 2024-01-01 | End: 2024-01-01

## 2024-01-01 RX ORDER — PROPOFOL 10 MG/ML
INJECTION, EMULSION INTRAVENOUS AS NEEDED
Status: DISCONTINUED | OUTPATIENT
Start: 2024-01-01 | End: 2024-01-01

## 2024-01-01 RX ORDER — HEPARIN SODIUM 5000 [USP'U]/ML
5000 INJECTION, SOLUTION INTRAVENOUS; SUBCUTANEOUS EVERY 8 HOURS SCHEDULED
Status: DISCONTINUED | OUTPATIENT
Start: 2024-01-01 | End: 2024-01-03

## 2024-01-01 RX ORDER — ACETAMINOPHEN 325 MG/1
975 TABLET ORAL ONCE
Status: COMPLETED | OUTPATIENT
Start: 2024-01-01 | End: 2024-01-01

## 2024-01-01 RX ORDER — CEFTRIAXONE 1 G/50ML
1000 INJECTION, SOLUTION INTRAVENOUS EVERY 24 HOURS
Status: DISCONTINUED | OUTPATIENT
Start: 2024-01-02 | End: 2024-01-01

## 2024-01-01 RX ORDER — ONDANSETRON 2 MG/ML
INJECTION INTRAMUSCULAR; INTRAVENOUS AS NEEDED
Status: DISCONTINUED | OUTPATIENT
Start: 2024-01-01 | End: 2024-01-01

## 2024-01-01 RX ORDER — CEFAZOLIN SODIUM 2 G/50ML
2000 SOLUTION INTRAVENOUS
Status: DISCONTINUED | OUTPATIENT
Start: 2024-01-02 | End: 2024-01-01 | Stop reason: HOSPADM

## 2024-01-01 RX ORDER — SUCCINYLCHOLINE/SOD CL,ISO/PF 100 MG/5ML
SYRINGE (ML) INTRAVENOUS AS NEEDED
Status: DISCONTINUED | OUTPATIENT
Start: 2024-01-01 | End: 2024-01-01

## 2024-01-01 RX ORDER — LIDOCAINE HYDROCHLORIDE 20 MG/ML
INJECTION, SOLUTION EPIDURAL; INFILTRATION; INTRACAUDAL; PERINEURAL AS NEEDED
Status: DISCONTINUED | OUTPATIENT
Start: 2024-01-01 | End: 2024-01-01

## 2024-01-01 RX ORDER — KETOROLAC TROMETHAMINE 30 MG/ML
15 INJECTION, SOLUTION INTRAMUSCULAR; INTRAVENOUS ONCE
Status: COMPLETED | OUTPATIENT
Start: 2024-01-01 | End: 2024-01-01

## 2024-01-01 RX ORDER — MAGNESIUM HYDROXIDE 1200 MG/15ML
LIQUID ORAL AS NEEDED
Status: DISCONTINUED | OUTPATIENT
Start: 2024-01-01 | End: 2024-01-01 | Stop reason: HOSPADM

## 2024-01-01 RX ORDER — CEFTRIAXONE 2 G/50ML
2000 INJECTION, SOLUTION INTRAVENOUS EVERY 24 HOURS
Status: DISCONTINUED | OUTPATIENT
Start: 2024-01-02 | End: 2024-01-04 | Stop reason: HOSPADM

## 2024-01-01 RX ORDER — MORPHINE SULFATE 4 MG/ML
4 INJECTION, SOLUTION INTRAMUSCULAR; INTRAVENOUS EVERY 4 HOURS PRN
Status: DISCONTINUED | OUTPATIENT
Start: 2024-01-01 | End: 2024-01-02

## 2024-01-01 RX ORDER — ONDANSETRON 2 MG/ML
4 INJECTION INTRAMUSCULAR; INTRAVENOUS ONCE
Status: COMPLETED | OUTPATIENT
Start: 2024-01-01 | End: 2024-01-01

## 2024-01-01 RX ADMIN — FENTANYL CITRATE 50 MCG: 0.05 INJECTION, SOLUTION INTRAMUSCULAR; INTRAVENOUS at 23:41

## 2024-01-01 RX ADMIN — PHENYLEPHRINE HYDROCHLORIDE 20 MCG/MIN: 10 INJECTION INTRAVENOUS at 22:37

## 2024-01-01 RX ADMIN — SODIUM CHLORIDE 1340 ML: 0.9 INJECTION, SOLUTION INTRAVENOUS at 18:56

## 2024-01-01 RX ADMIN — SODIUM CHLORIDE, SODIUM GLUCONATE, SODIUM ACETATE, POTASSIUM CHLORIDE, MAGNESIUM CHLORIDE, SODIUM PHOSPHATE, DIBASIC, AND POTASSIUM PHOSPHATE 1000 ML: .53; .5; .37; .037; .03; .012; .00082 INJECTION, SOLUTION INTRAVENOUS at 23:16

## 2024-01-01 RX ADMIN — ONDANSETRON 4 MG: 2 INJECTION INTRAMUSCULAR; INTRAVENOUS at 16:25

## 2024-01-01 RX ADMIN — KETOROLAC TROMETHAMINE 15 MG: 30 INJECTION, SOLUTION INTRAMUSCULAR at 16:26

## 2024-01-01 RX ADMIN — CHLORHEXIDINE GLUCONATE 0.12% ORAL RINSE 15 ML: 1.2 LIQUID ORAL at 23:52

## 2024-01-01 RX ADMIN — FENTANYL CITRATE 50 MCG: 50 INJECTION INTRAMUSCULAR; INTRAVENOUS at 22:24

## 2024-01-01 RX ADMIN — MORPHINE SULFATE 4 MG: 4 INJECTION INTRAVENOUS at 18:40

## 2024-01-01 RX ADMIN — ACETAMINOPHEN 975 MG: 325 TABLET, FILM COATED ORAL at 18:39

## 2024-01-01 RX ADMIN — ONDANSETRON 4 MG: 2 INJECTION INTRAMUSCULAR; INTRAVENOUS at 22:31

## 2024-01-01 RX ADMIN — Medication 90 MG: at 22:26

## 2024-01-01 RX ADMIN — DEXAMETHASONE SODIUM PHOSPHATE 5 MG: 10 INJECTION INTRAMUSCULAR; INTRAVENOUS at 22:31

## 2024-01-01 RX ADMIN — CEFTRIAXONE 1000 MG: 1 INJECTION, SOLUTION INTRAVENOUS at 17:24

## 2024-01-01 RX ADMIN — LIDOCAINE HYDROCHLORIDE 40 MG: 20 INJECTION, SOLUTION EPIDURAL; INFILTRATION; INTRACAUDAL at 22:26

## 2024-01-01 RX ADMIN — Medication 50 MCG: at 23:41

## 2024-01-01 RX ADMIN — NOREPINEPHRINE BITARTRATE 4 MCG/MIN: 1 INJECTION, SOLUTION, CONCENTRATE INTRAVENOUS at 23:26

## 2024-01-01 RX ADMIN — SODIUM CHLORIDE: 0.9 INJECTION, SOLUTION INTRAVENOUS at 22:09

## 2024-01-01 RX ADMIN — SODIUM CHLORIDE 125 ML/HR: 0.9 INJECTION, SOLUTION INTRAVENOUS at 23:48

## 2024-01-01 RX ADMIN — SODIUM CHLORIDE 1000 ML: 0.9 INJECTION, SOLUTION INTRAVENOUS at 16:20

## 2024-01-01 RX ADMIN — VANCOMYCIN HYDROCHLORIDE 1500 MG: 5 INJECTION, POWDER, LYOPHILIZED, FOR SOLUTION INTRAVENOUS at 20:38

## 2024-01-01 RX ADMIN — MORPHINE SULFATE 4 MG: 4 INJECTION INTRAVENOUS at 16:27

## 2024-01-01 RX ADMIN — SODIUM CHLORIDE: 0.9 INJECTION, SOLUTION INTRAVENOUS at 22:45

## 2024-01-01 RX ADMIN — IOHEXOL 100 ML: 350 INJECTION, SOLUTION INTRAVENOUS at 17:10

## 2024-01-01 RX ADMIN — HEPARIN SODIUM 5000 UNITS: 5000 INJECTION INTRAVENOUS; SUBCUTANEOUS at 23:53

## 2024-01-01 RX ADMIN — CEFEPIME HYDROCHLORIDE 2000 MG: 2 INJECTION, SOLUTION INTRAVENOUS at 20:00

## 2024-01-01 RX ADMIN — PROPOFOL 180 MG: 10 INJECTION, EMULSION INTRAVENOUS at 22:26

## 2024-01-01 NOTE — ED PROVIDER NOTES
History  Chief Complaint   Patient presents with    Back Pain     Pt c/o lower back pain and vomiting that started on Thursday      Patient is a 23-year-old female no past medical history presenting with right-sided low back pain.  Patient states for last 4 days she has had intermittent nonradiating right flank pain associated with multiple episodes today of vomiting, chills.  Has been taking ibuprofen with some relief last dose was 8 hours ago.  Has been using ice and heat with no relief.  Denies any cough or fevers, vaginal bleeding, notes normal vaginal discharge which is unchanged, denies any urinary symptoms, chest pain, shortness of breath, rashes, vision changes.  Does note lightheadedness as well as nasal congestion.        Prior to Admission Medications   Prescriptions Last Dose Informant Patient Reported? Taking?   Multiple Vitamin (multivitamin) tablet   Yes Yes   Sig: Take 1 tablet by mouth daily   norgestimate-ethinyl estradiol (Sprintec 28) 0.25-35 MG-MCG per tablet   No Yes   Sig: Take 1 tablet by mouth daily      Facility-Administered Medications: None       Past Medical History:   Diagnosis Date    Acne vulgaris        Past Surgical History:   Procedure Laterality Date    NO PAST SURGERIES         Family History   Problem Relation Age of Onset    No Known Problems Mother     No Known Problems Father     Multiple sclerosis Maternal Grandmother     Diabetes Maternal Grandmother     Heart disease Maternal Grandfather     No Known Problems Paternal Grandmother     No Known Problems Paternal Grandfather     No Known Problems Sister     Alcohol abuse Neg Hx     Substance Abuse Neg Hx     Mental illness Neg Hx      I have reviewed and agree with the history as documented.    E-Cigarette/Vaping    E-Cigarette Use Current Some Day User      E-Cigarette/Vaping Substances    Nicotine Yes     THC No     CBD No     Flavoring Yes      Social History     Tobacco Use    Smoking status: Some Days     Types:  Cigarettes    Smokeless tobacco: Never   Vaping Use    Vaping status: Some Days    Substances: Nicotine, Flavoring   Substance Use Topics    Alcohol use: Yes     Alcohol/week: 2.0 standard drinks of alcohol     Types: 2 Standard drinks or equivalent per week    Drug use: No       Review of Systems   All other systems reviewed and are negative.      Physical Exam  Physical Exam  Vitals reviewed.   Constitutional:       General: She is not in acute distress.     Appearance: Normal appearance. She is ill-appearing.   HENT:      Mouth/Throat:      Mouth: Mucous membranes are moist.   Eyes:      Conjunctiva/sclera: Conjunctivae normal.   Cardiovascular:      Rate and Rhythm: Normal rate and regular rhythm.      Pulses: Normal pulses.      Heart sounds: Normal heart sounds.   Pulmonary:      Effort: Pulmonary effort is normal.      Breath sounds: Normal breath sounds.   Abdominal:      General: Abdomen is flat.      Palpations: Abdomen is soft.      Tenderness: There is abdominal tenderness. There is right CVA tenderness. There is no left CVA tenderness or guarding.      Comments: Mild right lower quadrant tenderness without guarding, right CVA tenderness   Musculoskeletal:         General: No swelling. Normal range of motion.      Cervical back: Neck supple.      Right lower leg: No edema.      Left lower leg: No edema.   Skin:     General: Skin is warm and dry.   Neurological:      General: No focal deficit present.      Mental Status: She is alert.   Psychiatric:         Mood and Affect: Mood normal.         Vital Signs  ED Triage Vitals   Temperature Pulse Respirations Blood Pressure SpO2   01/01/24 1509 01/01/24 1509 01/01/24 1509 01/01/24 1509 01/01/24 1509   97.9 °F (36.6 °C) (!) 119 18 132/98 100 %      Temp Source Heart Rate Source Patient Position - Orthostatic VS BP Location FiO2 (%)   01/01/24 1509 01/01/24 1509 01/01/24 1509 01/01/24 1509 --   Tympanic Monitor Sitting Left arm       Pain Score       01/01/24  1626       9           Vitals:    01/01/24 1509 01/01/24 1831 01/01/24 1931 01/01/24 2029   BP: 132/98 170/78 115/57 108/56   Pulse: (!) 119 (!) 150 (!) 133 (!) 130   Patient Position - Orthostatic VS: Sitting Lying Sitting Sitting         Visual Acuity      ED Medications  Medications   sodium chloride 0.9 % bolus 1,000 mL (0 mL Intravenous Stopped 1/1/24 1720)   ondansetron (ZOFRAN) injection 4 mg (4 mg Intravenous Given 1/1/24 1625)   ketorolac (TORADOL) injection 15 mg (15 mg Intravenous Given 1/1/24 1626)   morphine injection 4 mg (4 mg Intravenous Given 1/1/24 1627)   cefTRIAXone (ROCEPHIN) IVPB (premix in dextrose) 1,000 mg 50 mL (0 mg Intravenous Stopped 1/1/24 1754)   iohexol (OMNIPAQUE) 350 MG/ML injection (MULTI-DOSE) 100 mL (100 mL Intravenous Given 1/1/24 1710)   morphine injection 4 mg (4 mg Intravenous Given 1/1/24 1840)   acetaminophen (TYLENOL) tablet 975 mg (975 mg Oral Given 1/1/24 1839)   sodium chloride 0.9 % bolus 1,340 mL (0 mL Intravenous Stopped 1/1/24 1956)   cefepime (MAXIPIME) IVPB (premix in dextrose) 2,000 mg 50 mL (0 mg Intravenous Stopped 1/1/24 2030)       Diagnostic Studies  Results Reviewed       Procedure Component Value Units Date/Time    Lactic acid 2 Hours [[id number]]  (Normal) Collected: 01/01/24 1938    Lab Status: Final result Specimen: Blood from Arm, Right Updated: 01/01/24 2010     LACTIC ACID 1.6 mmol/L     Narrative:      Result may be elevated if tourniquet was used during collection.    Urine Microscopic [[id number]]  (Abnormal) Collected: 01/01/24 1725    Lab Status: Final result Specimen: Urine, Clean Catch Updated: 01/01/24 1744     RBC, UA 20-30 /hpf      WBC, UA Innumerable /hpf      Epithelial Cells Occasional /hpf      Bacteria, UA Occasional /hpf     Urine culture [[id number]] Collected: 01/01/24 1725    Lab Status: In process Specimen: Urine, Clean Catch Updated: 01/01/24 1744    UA w Reflex to Microscopic w Reflex to Culture [[id number]]  (Abnormal) Collected:  01/01/24 1725    Lab Status: Final result Specimen: Urine, Clean Catch Updated: 01/01/24 1738     Color, UA Light Yellow     Clarity, UA Clear     Specific Gravity, UA 1.049     pH, UA 6.0     Leukocytes, UA Small     Nitrite, UA Positive     Protein, UA 30 (1+) mg/dl      Glucose, UA Negative mg/dl      Ketones, UA Trace mg/dl      Urobilinogen, UA <2.0 mg/dl      Bilirubin, UA Negative     Occult Blood, UA Small    CBC and differential [[id number]]  (Abnormal) Collected: 01/01/24 1616    Lab Status: Final result Specimen: Blood from Arm, Right Updated: 01/01/24 1715     WBC 10.68 Thousand/uL      RBC 4.82 Million/uL      Hemoglobin 13.7 g/dL      Hematocrit 40.9 %      MCV 85 fL      MCH 28.4 pg      MCHC 33.5 g/dL      RDW 12.8 %      MPV 11.7 fL      Platelets 143 Thousands/uL      nRBC 0 /100 WBCs      Neutrophils Relative 95 %      Immat GRANS % 0 %      Lymphocytes Relative 4 %      Monocytes Relative 1 %      Eosinophils Relative 0 %      Basophils Relative 0 %      Neutrophils Absolute 10.17 Thousands/µL      Immature Grans Absolute 0.03 Thousand/uL      Lymphocytes Absolute 0.38 Thousands/µL      Monocytes Absolute 0.06 Thousand/µL      Eosinophils Absolute 0.01 Thousand/µL      Basophils Absolute 0.03 Thousands/µL     Narrative:      This is an appended report.  These results have been appended to a previously verified report.    FLU/RSV/COVID - if FLU/RSV clinically relevant [[id number]]  (Normal) Collected: 01/01/24 1622    Lab Status: Final result Specimen: Nares from Nose Updated: 01/01/24 1706     SARS-CoV-2 Negative     INFLUENZA A PCR Negative     INFLUENZA B PCR Negative     RSV PCR Negative    Narrative:      FOR PEDIATRIC PATIENTS - copy/paste COVID Guidelines URL to browser: [email or web address]    SARS-CoV-2 assay is a Nucleic Acid Amplification assay intended for the  qualitative detection of nucleic acid from SARS-CoV-2 in  nasopharyngeal  swabs. Results are for the presumptive identification of SARS-CoV-2 RNA.    Positive results are indicative of infection with SARS-CoV-2, the virus  causing COVID-19, but do not rule out bacterial infection or co-infection  with other viruses. Laboratories within the [place] and its  territories are required to report all positive results to the appropriate  public health authorities. Negative results do not preclude SARS-CoV-2  infection and should not be used as the sole basis for treatment or other  patient management decisions. Negative results must be combined with  clinical observations, patient history, and epidemiological information.  This test has not been FDA cleared or approved.    This test has been authorized by FDA under an Emergency Use Authorization  (EUA). This test is only authorized for the duration of time the  declaration that circumstances exist justifying the authorization of the  emergency use of an in vitro diagnostic tests for detection of SARS-CoV-2  virus and/or diagnosis of COVID-19 infection under section 564(b)(1) of  the Act, 21 U.S.C. 360bbb-3(b)(1), unless the authorization is terminated  or revoked sooner. The test has been validated but independent review by FDA  and CLIA is pending.    Test performed using [employer] GeneXpert: This RT-PCR assay targets N2,  a region unique to SARS-CoV-2. A conserved region in the E-gene was chosen  for pan-Sarbecovirus detection which includes SARS-CoV-2.    According to CMS-2020-01-R, this platform meets the definition of high-throughput technology.    hCG, qualitative pregnancy [[id number]]  (Normal) Collected: 01/01/24 1616    Lab Status: Final result Specimen: Blood from Arm, Right Updated: 01/01/24 1659     Preg, Serum Negative    Lactic acid, plasma (w/reflex if result > 2.0) [[id number]]  (Abnormal) Collected: 01/01/24 1616    Lab Status: Final result Specimen: Blood from Arm, Right Updated: 01/01/24 1659     LACTIC ACID  3.2 mmol/L     Narrative:      Result may be elevated if tourniquet was used during collection.    Comprehensive metabolic panel [[id number]]  (Abnormal) Collected: 01/01/24 1616    Lab Status: Final result Specimen: Blood from Arm, Right Updated: 01/01/24 1650     Sodium 139 mmol/L      Potassium 4.1 mmol/L      Chloride 105 mmol/L      CO2 21 mmol/L      ANION GAP 13 mmol/L      BUN 17 mg/dL      Creatinine 1.47 mg/dL      Glucose 140 mg/dL      Calcium 9.4 mg/dL      AST 16 U/L      ALT 14 U/L      Alkaline Phosphatase 43 U/L      Total Protein 7.7 g/dL      Albumin 4.7 g/dL      Total Bilirubin 1.23 mg/dL      eGFR 50 ml/min/1.73sq m     Narrative:      National Kidney Disease Foundation guidelines for Chronic Kidney Disease (CKD):     Stage 1 with normal or high GFR (GFR > 90 mL/min/1.73 square meters)    Stage 2 Mild CKD (GFR = 60-89 mL/min/1.73 square meters)    Stage 3A Moderate CKD (GFR = 45-59 mL/min/1.73 square meters)    Stage 3B Moderate CKD (GFR = 30-44 mL/min/1.73 square meters)    Stage 4 Severe CKD (GFR = 15-29 mL/min/1.73 square meters)    Stage 5 End Stage CKD (GFR <15 mL/min/1.73 square meters)  Note: GFR calculation is accurate only with a steady state creatinine    Lipase [[id number]]  (Abnormal) Collected: 01/01/24 1616    Lab Status: Final result Specimen: Blood from Arm, Right Updated: 01/01/24 1650     Lipase 6 u/L     Blood culture #1 [[id number]] Collected: 01/01/24 1621    Lab Status: In process Specimen: Blood from Hand, Right Updated: 01/01/24 1625    Blood culture #2 [[id number]] Collected: 01/01/24 1616    Lab Status: In process Specimen: Blood from Arm, Right Updated: 01/01/24 1622                   CT abdomen pelvis wo contrast   Final Result by Ben Stone MD (01/01 2132)      Reidentified 5 mm proximal right ureteral calculus causing mild to moderate hydronephrosis with delayed nephrogram compatible with obstructive uropathy. A moderate amount of perinephric fluid is similar  to the prior exam and again suspicious for calyceal    rupture.         Workstation performed: [id number]         CT abdomen pelvis with contrast   Final Result by Elli Rice MD (01/01 1916)      Obstructing 5 mm ureteral calculus at the right ureteropelvic junction with resultant moderate right hydronephrosis, right pyelitis, delayed right nephrogram, and moderate right perinephric fluid which raises concern for right calyceal rupture. Recommend    repeat CT abdomen pelvis without contrast now to evaluate for for the possibility of a calyceal rupture.      Mild hepatosplenomegaly.      I personally discussed this study with PAOLA KELLOGG on 1/1/2024 7:11 PM.            Workstation performed: [id number]                    Procedures  CriticalCare Time    Date/Time: 1/1/2024 11:53 PM    Performed by: Paola Kellogg DO  Authorized by: Paola Kellogg DO    Critical care provider statement:     Critical care time (minutes):  45    Critical care was necessary to treat or prevent imminent or life-threatening deterioration of the following conditions:  Sepsis, renal failure and shock    Critical care was time spent personally by me on the following activities:  Obtaining history from patient or surrogate, development of treatment plan with patient or surrogate, discussions with consultants, evaluation of patient's response to treatment, examination of patient, interpretation of cardiac output measurements, ordering and performing treatments and interventions, ordering and review of laboratory studies, ordering and review of radiographic studies and re-evaluation of patient's condition           ED Course  ED Course as of 01/01/24 2354   Mon Jan 01, 2024   1841 Patient now febrile, received ceftriaxone for pyelonephritis, will complete 30 cc/kg bolus, give antipyretic and admit pending read of CT   1915 With infected obstructive stone with possible fornix rupture will repeat CT without contrast per  radiology discussed with urology.   1945 Will transfer to [place] or                               [facility] 22yo+      Flowsheet Row Most Recent Value   Initial Alcohol Screen: US AUDIT-C     1. How often do you have a drink containing alcohol? 0 Filed at: 01/01/2024 1511   2. How many drinks containing alcohol do you have on a typical day you are drinking?  0 Filed at: 01/01/2024 1511   3b. FEMALE Any Age, or MALE 65+: How often do you have 4 or more drinks on one occassion? 0 Filed at: 01/01/2024 1511   Audit-C Score 0 Filed at: 01/01/2024 1511   DAMIEN: How many times in the past year have you...    Used an illegal drug or used a prescription medication for non-medical reasons? Never Filed at: 01/01/2024 1511                      Medical Decision Making  Patient is a 23-year-old female no past medical history presenting with back pain.  Patient is ill-appearing at bedside, tremulous, pale with dry mucous membranes and mentating appropriately and in no acute distress with mild right lower quadrant tenderness, marked right CVA tenderness and no other significant physical exam findings.  Will obtain CT to assess for signs of kidney stone, appendicitis, pyelonephritis, intra-abdominal abscess, less likely diverticulitis, have lower concern for ovarian torsion given patient is far more tender to CVA than right lower quadrant however will consider if abnormal findings on CT, will obtain labs to assess for electrolyte abnormalities, anemia, leukocytosis, pancreatitis, pregnancy test to assess for ectopic pregnancy, give symptomatic management reassess.    Amount and/or Complexity of Data Reviewed  Labs: ordered.  Radiology: ordered.    Risk  OTC drugs.  Prescription drug management.             Disposition  Final diagnoses:   Kidney stone   Sepsis (HCC)     Time reflects when diagnosis was documented in both MDM as applicable and the Disposition within this note       Time User Action Codes Description Comment    1/1/2024   7:28 PM Joyce Avila Add [N20.1] Ureteral stone     1/1/2024  7:46 PM Paola Crow Add [N20.0] Kidney stone     1/1/2024  7:46 PM Paola Crow Add [A41.9] Sepsis (HCC)           ED Disposition       ED Disposition   Transfer to Another Facility-In Network    Condition   --    Date/Time   Mon Jan 1, 2024 1946    Comment   Amanda Low should be transferred out to [facility].               MD Documentation      Flowsheet Row Most Recent Value   Patient Condition The patient has been stabilized such that within reasonable medical probability, no material deterioration of the patient condition or the condition of the unborn child(clay) is likely to result from the transfer   Reason for Transfer Level of Care needed not available at this facility, Other (Include comment)____________________   Benefits of Transfer Specialized equipment and/or services available at the receiving facility (Include comment)________________________, Other benefits (Include comment)_______________________   Risks of Transfer Potential for delay in receiving treatment, Other: (Include comment)__________________________   Accepting Physician Navdeep   Accepting Facility Name, [facility]    (Name & Tel number) PACS   Sending MD Crow   Provider Certification General risk, such as traffic hazards, adverse weather conditions, rough terrain or turbulence, possible failure of equipment (including vehicle or aircraft), or consequences of actions of persons outside the control of the transport personnel, Unanticipated needs of medical equipment and personnel during transport, Risk of worsening condition, The possibility of a transport vehicle being unavailable          RN Documentation      Flowsheet Row Most Recent Value   Accepting Facility Name, [facility]    (Name & Tel number) PACS          Follow-up Information    None         Discharge Medication List as of 1/1/2024  8:52 PM         CONTINUE these medications which have NOT CHANGED    Details   Multiple Vitamin (multivitamin) tablet Take 1 tablet by mouth daily, Historical Med      norgestimate-ethinyl estradiol (Sprintec 28) 0.25-35 MG-MCG per tablet Take 1 tablet by mouth daily, Starting Thu 6/15/2023, Normal             No discharge procedures on file.    PDMP Review       None            ED Provider  Electronically Signed by             Paola Crow DO  01/01/24 6197

## 2024-01-02 PROBLEM — N17.9 AKI (ACUTE KIDNEY INJURY) (HCC): Status: ACTIVE | Noted: 2024-01-02

## 2024-01-02 PROBLEM — R65.21 SEPTIC SHOCK (HCC): Status: ACTIVE | Noted: 2024-01-02

## 2024-01-02 PROBLEM — A41.9 SEPTIC SHOCK (HCC): Status: ACTIVE | Noted: 2024-01-02

## 2024-01-02 LAB
ALBUMIN SERPL BCP-MCNC: 2.9 G/DL (ref 3.5–5)
ALP SERPL-CCNC: 21 U/L (ref 34–104)
ALT SERPL W P-5'-P-CCNC: 11 U/L (ref 7–52)
ANION GAP SERPL CALCULATED.3IONS-SCNC: 7 MMOL/L
AST SERPL W P-5'-P-CCNC: 13 U/L (ref 13–39)
ATRIAL RATE: 141 BPM
BASOPHILS # BLD MANUAL: 0 THOUSAND/UL (ref 0–0.1)
BASOPHILS NFR MAR MANUAL: 0 % (ref 0–1)
BILIRUB DIRECT SERPL-MCNC: 0.21 MG/DL (ref 0–0.2)
BILIRUB SERPL-MCNC: 0.65 MG/DL (ref 0.2–1)
BUN SERPL-MCNC: 17 MG/DL (ref 5–25)
CALCIUM SERPL-MCNC: 6.6 MG/DL (ref 8.4–10.2)
CHLORIDE SERPL-SCNC: 114 MMOL/L (ref 96–108)
CO2 SERPL-SCNC: 17 MMOL/L (ref 21–32)
CREAT SERPL-MCNC: 1.41 MG/DL (ref 0.6–1.3)
EOSINOPHIL # BLD MANUAL: 0 THOUSAND/UL (ref 0–0.4)
EOSINOPHIL NFR BLD MANUAL: 0 % (ref 0–6)
ERYTHROCYTE [DISTWIDTH] IN BLOOD BY AUTOMATED COUNT: 13.4 % (ref 11.6–15.1)
GFR SERPL CREATININE-BSD FRML MDRD: 53 ML/MIN/1.73SQ M
GLUCOSE SERPL-MCNC: 134 MG/DL (ref 65–140)
HCT VFR BLD AUTO: 31.2 % (ref 34.8–46.1)
HGB BLD-MCNC: 10.2 G/DL (ref 11.5–15.4)
LYMPHOCYTES # BLD AUTO: 0.16 THOUSAND/UL (ref 0.6–4.47)
LYMPHOCYTES # BLD AUTO: 1 % (ref 14–44)
MAGNESIUM SERPL-MCNC: 1 MG/DL (ref 1.9–2.7)
MCH RBC QN AUTO: 28.5 PG (ref 26.8–34.3)
MCHC RBC AUTO-ENTMCNC: 32.7 G/DL (ref 31.4–37.4)
MCV RBC AUTO: 87 FL (ref 82–98)
MONOCYTES # BLD AUTO: 0.97 THOUSAND/UL (ref 0–1.22)
MONOCYTES NFR BLD: 6 % (ref 4–12)
NEUTROPHILS # BLD MANUAL: 14.99 THOUSAND/UL (ref 1.85–7.62)
NEUTS BAND NFR BLD MANUAL: 13 % (ref 0–8)
NEUTS SEG NFR BLD AUTO: 80 % (ref 43–75)
P AXIS: 69 DEGREES
PHOSPHATE SERPL-MCNC: 3 MG/DL (ref 2.7–4.5)
PLATELET # BLD AUTO: 65 THOUSANDS/UL (ref 149–390)
PLATELET # BLD AUTO: 78 THOUSANDS/UL (ref 149–390)
PLATELET BLD QL SMEAR: ABNORMAL
PMV BLD AUTO: 11.7 FL (ref 8.9–12.7)
PMV BLD AUTO: 12.4 FL (ref 8.9–12.7)
POTASSIUM SERPL-SCNC: 3.2 MMOL/L (ref 3.5–5.3)
PR INTERVAL: 134 MS
PROT SERPL-MCNC: 4.7 G/DL (ref 6.4–8.4)
QRS AXIS: 98 DEGREES
QRSD INTERVAL: 80 MS
QT INTERVAL: 278 MS
QTC INTERVAL: 423 MS
RBC # BLD AUTO: 3.58 MILLION/UL (ref 3.81–5.12)
RBC MORPH BLD: NORMAL
SODIUM SERPL-SCNC: 138 MMOL/L (ref 135–147)
T WAVE AXIS: 31 DEGREES
VENTRICULAR RATE: 139 BPM
WBC # BLD AUTO: 16.12 THOUSAND/UL (ref 4.31–10.16)

## 2024-01-02 PROCEDURE — 99232 SBSQ HOSP IP/OBS MODERATE 35: CPT | Performed by: NURSE PRACTITIONER

## 2024-01-02 PROCEDURE — 83735 ASSAY OF MAGNESIUM: CPT | Performed by: UROLOGY

## 2024-01-02 PROCEDURE — 80076 HEPATIC FUNCTION PANEL: CPT | Performed by: UROLOGY

## 2024-01-02 PROCEDURE — 85007 BL SMEAR W/DIFF WBC COUNT: CPT | Performed by: UROLOGY

## 2024-01-02 PROCEDURE — 84100 ASSAY OF PHOSPHORUS: CPT | Performed by: UROLOGY

## 2024-01-02 PROCEDURE — 85049 AUTOMATED PLATELET COUNT: CPT | Performed by: UROLOGY

## 2024-01-02 PROCEDURE — 80048 BASIC METABOLIC PNL TOTAL CA: CPT | Performed by: UROLOGY

## 2024-01-02 PROCEDURE — 85027 COMPLETE CBC AUTOMATED: CPT | Performed by: UROLOGY

## 2024-01-02 PROCEDURE — 99291 CRITICAL CARE FIRST HOUR: CPT | Performed by: NURSE PRACTITIONER

## 2024-01-02 RX ORDER — OXYCODONE HYDROCHLORIDE 5 MG/1
5 TABLET ORAL EVERY 4 HOURS PRN
Status: DISCONTINUED | OUTPATIENT
Start: 2024-01-02 | End: 2024-01-02

## 2024-01-02 RX ORDER — OXYCODONE HYDROCHLORIDE 10 MG/1
10 TABLET ORAL EVERY 4 HOURS PRN
Status: DISCONTINUED | OUTPATIENT
Start: 2024-01-02 | End: 2024-01-04 | Stop reason: HOSPADM

## 2024-01-02 RX ORDER — OXYCODONE HYDROCHLORIDE 5 MG/1
5 TABLET ORAL EVERY 4 HOURS PRN
Status: DISCONTINUED | OUTPATIENT
Start: 2024-01-02 | End: 2024-01-04 | Stop reason: HOSPADM

## 2024-01-02 RX ORDER — ONDANSETRON 2 MG/ML
4 INJECTION INTRAMUSCULAR; INTRAVENOUS EVERY 6 HOURS PRN
Status: DISCONTINUED | OUTPATIENT
Start: 2024-01-02 | End: 2024-01-02

## 2024-01-02 RX ORDER — POTASSIUM CHLORIDE 20 MEQ/1
40 TABLET, EXTENDED RELEASE ORAL
Status: COMPLETED | OUTPATIENT
Start: 2024-01-02 | End: 2024-01-02

## 2024-01-02 RX ORDER — ACETAMINOPHEN 325 MG/1
650 TABLET ORAL EVERY 6 HOURS PRN
Status: DISCONTINUED | OUTPATIENT
Start: 2024-01-02 | End: 2024-01-02

## 2024-01-02 RX ORDER — ACETAMINOPHEN 325 MG/1
650 TABLET ORAL EVERY 6 HOURS PRN
Status: DISCONTINUED | OUTPATIENT
Start: 2024-01-02 | End: 2024-01-04 | Stop reason: HOSPADM

## 2024-01-02 RX ORDER — OXYCODONE HYDROCHLORIDE 10 MG/1
10 TABLET ORAL EVERY 4 HOURS PRN
Status: DISCONTINUED | OUTPATIENT
Start: 2024-01-02 | End: 2024-01-02

## 2024-01-02 RX ORDER — MORPHINE SULFATE 4 MG/ML
4 INJECTION, SOLUTION INTRAMUSCULAR; INTRAVENOUS EVERY 4 HOURS PRN
Status: DISCONTINUED | OUTPATIENT
Start: 2024-01-02 | End: 2024-01-02

## 2024-01-02 RX ORDER — ONDANSETRON 4 MG/1
4 TABLET, ORALLY DISINTEGRATING ORAL EVERY 6 HOURS PRN
Status: DISCONTINUED | OUTPATIENT
Start: 2024-01-02 | End: 2024-01-04 | Stop reason: HOSPADM

## 2024-01-02 RX ORDER — MAGNESIUM SULFATE HEPTAHYDRATE 40 MG/ML
4 INJECTION, SOLUTION INTRAVENOUS ONCE
Status: COMPLETED | OUTPATIENT
Start: 2024-01-02 | End: 2024-01-02

## 2024-01-02 RX ADMIN — POTASSIUM CHLORIDE 40 MEQ: 1500 TABLET, EXTENDED RELEASE ORAL at 10:44

## 2024-01-02 RX ADMIN — CEFTRIAXONE 2000 MG: 2 INJECTION, SOLUTION INTRAVENOUS at 09:13

## 2024-01-02 RX ADMIN — POTASSIUM CHLORIDE 40 MEQ: 1500 TABLET, EXTENDED RELEASE ORAL at 15:06

## 2024-01-02 RX ADMIN — ONDANSETRON 4 MG: 4 TABLET, ORALLY DISINTEGRATING ORAL at 21:46

## 2024-01-02 RX ADMIN — OXYCODONE HYDROCHLORIDE 10 MG: 10 TABLET ORAL at 19:25

## 2024-01-02 RX ADMIN — MORPHINE SULFATE 2 MG: 2 INJECTION, SOLUTION INTRAMUSCULAR; INTRAVENOUS at 21:46

## 2024-01-02 RX ADMIN — OXYCODONE HYDROCHLORIDE 10 MG: 10 TABLET ORAL at 14:51

## 2024-01-02 RX ADMIN — ACETAMINOPHEN 325MG 650 MG: 325 TABLET ORAL at 19:26

## 2024-01-02 RX ADMIN — HEPARIN SODIUM 5000 UNITS: 5000 INJECTION INTRAVENOUS; SUBCUTANEOUS at 05:11

## 2024-01-02 RX ADMIN — OXYCODONE HYDROCHLORIDE 10 MG: 10 TABLET ORAL at 08:12

## 2024-01-02 RX ADMIN — OXYCODONE HYDROCHLORIDE 10 MG: 10 TABLET ORAL at 02:04

## 2024-01-02 RX ADMIN — CHLORHEXIDINE GLUCONATE 0.12% ORAL RINSE 15 ML: 1.2 LIQUID ORAL at 08:12

## 2024-01-02 RX ADMIN — MAGNESIUM SULFATE IN WATER 4 G: 40 INJECTION, SOLUTION INTRAVENOUS at 14:42

## 2024-01-02 RX ADMIN — SODIUM CHLORIDE 125 ML/HR: 0.9 INJECTION, SOLUTION INTRAVENOUS at 06:57

## 2024-01-02 RX ADMIN — HEPARIN SODIUM 5000 UNITS: 5000 INJECTION INTRAVENOUS; SUBCUTANEOUS at 14:42

## 2024-01-02 RX ADMIN — HEPARIN SODIUM 5000 UNITS: 5000 INJECTION INTRAVENOUS; SUBCUTANEOUS at 21:33

## 2024-01-02 NOTE — H&P
History and Physical - Critical Care Medicine   Amanda Low 23 y.o. female MRN: [id number]  Unit/Bed#: ICU 04 Encounter: [id number]      Assessment  Septic shock   Complicated UTI in setting of R ureteral calculus with mild to moderate hydronephrosis, likely calcyceal rupture  Acute kidney injury  Elevated lactic acid- improved    Plan  Received 2.3 L IVF bolus  Will give 1 more liter isolyte bolus  Levophed for MAP >65 currently at 4 - may need CVC if this worsens.  Ceftriaxone   Follow up blood and urine cultures  Hold further NSAIDs given TERESSA.  Morphine PRN  NPO  Urology consulted and planning on OR this evening.  DVT Px    Total critical care time excluding procedures, teaching and family discussions 32 minutes  Given critical illness, patient will require greater than two midnight stay.    Chief Complaint: R back pain    History of Present Illness   HPI:  Amanda Low is a 23 y.o. female who has no significant past medical history who presents with 3 days of R flank/back pain that has worsened.  She was taking NSAIDS to control this pain. Until today it was manageable with NSAIDs.  She was able to go out yesterday for New year's Geeth[place] and went out and had some alcohol (normal state of health).  She has some nausea and vomiting and dysuria today.  Fevers and chills today.  No other pain.  She has received 2.3 L IVF resuscitation.  She currently is on 4 of levophed with MAP of 74.  In the ED she received 1 g ceftriaxone, 2 g cefepime, vancomycin 1500 mg, 15 mg toradol, 4 mg morphine x 3. Zofran 4 mg.  She was transported from [facility] ED to [facility] ICU due to availability of urology this evening.  She had a CT A/P with 5 mm proximal right ureteral calculus with mild to moderate hydronephrosis.    She currently reports her pain is controled 3/10. No nausea currently.  She is NPO for OR with urology    She occasionally has a cigarette or vapes.  She occasionally drinks alcohol. No drugs    She has had  "UTI in past but nothing like this.      Review of systems:    A full ROS performed- see below for some pertinent positives and negatives.  All other symptoms denied.  Patient denies headache or vision changes.  Weight is stable.    Denies sore throat or runny nose.  She has fevers chills  Denies chest pain or palpitations.  Denies diarrhea.  She has nausea and vomiting  Denies lower extremity edema.  Denies skin rashes.    + dysuria, R back pain  All other review of systems are negative.    Historical Information   Past Medical History:   Diagnosis Date    Acne vulgaris      Past Surgical History:   Procedure Laterality Date    NO PAST SURGERIES       Family History   Problem Relation Age of Onset    No Known Problems Mother     No Known Problems Father     Multiple sclerosis Maternal Grandmother     Diabetes Maternal Grandmother     Heart disease Maternal Grandfather     No Known Problems Paternal Grandmother     No Known Problems Paternal Grandfather     No Known Problems Sister     Alcohol abuse Neg Hx     Substance Abuse Neg Hx     Mental illness Neg Hx            Tobacco history: She occasionally has a cigarette or vapes.  She occasionally drinks alcohol. No drugs    Meds/Allergies   No current facility-administered medications for this encounter.     Medications Prior to Admission   Medication    Multiple Vitamin (multivitamin) tablet    norgestimate-ethinyl estradiol (Sprintec 28) 0.25-35 MG-MCG per tablet     No Known Allergies    Vitals: Blood pressure 96/57, pulse (!) 126, temperature 99.2 °F (37.3 °C), temperature source Oral, resp. rate (!) 31, height 5' 7\" (1.702 m), weight 86.2 kg (190 lb 0.6 oz), SpO2 97%., room air, Body mass index is 29.76 kg/m².    Vasopressors/ Drips: 4 of levophed    Ventilator settings: N/A    No intake or output data in the 24 hours ending 01/01/24 2140    Physical exam:    Vitals:    01/01/24 2134   BP: 96/57   Pulse: (!) 126   Resp: (!) 31   Temp: 99.2 °F (37.3 °C)   SpO2: " 97%     General:  Patient is awake, alert, non-toxic and in no acute respiratory distress  Eyes: PERRL, no scleral icterus  Neck: No JVD  CV:  Tachycardic, +S1 and S2, No murmurs, gallops or rubs appreciated  Lungs: Clear to auscultation bilateral without wheeze, rales or rhonci  Abdomen: Soft, +BS, Non-tender, non-distended  Back- + R lower back tenderness  Extremities: No clubbing, cyanosis or edema  Neuro: No focal motor/sensory deficits  Skin: Warm, No rashes or ulcerations          Laboratory and Diagnostics  Results from last 7 days   Lab Units 01/01/24  1616   WBC Thousand/uL 10.68*   HEMOGLOBIN g/dL 13.7   HEMATOCRIT % 40.9   PLATELETS Thousands/uL 143*   NEUTROS PCT % 95*   MONOS PCT % 1*   EOS PCT % 0     Results from last 7 days   Lab Units 01/01/24  1616   SODIUM mmol/L 139   POTASSIUM mmol/L 4.1   CHLORIDE mmol/L 105   CO2 mmol/L 21   ANION GAP mmol/L 13   BUN mg/dL 17   CREATININE mg/dL 1.47*   CALCIUM mg/dL 9.4   GLUCOSE RANDOM mg/dL 140   ALT U/L 14   AST U/L 16   ALK PHOS U/L 43   ALBUMIN g/dL 4.7   TOTAL BILIRUBIN mg/dL 1.23*                   Results from last 7 days   Lab Units 01/01/24  1938 01/01/24  1616   LACTIC ACID mmol/L 1.6 3.2*                 Component      Latest Ref Rng 1/1/2024   Color, UA Light Yellow    Clarity, UA Clear    SL AMB SPECIFIC GRAVITY_URINE      1.003 - 1.030  1.049 (H)    pH, UA      4.5, 5.0, 5.5, 6.0, 6.5, 7.0, 7.5, 8.0  6.0    Leukocytes, UA      Negative  Small !    Nitrite, UA      Negative  Positive !    POCT URINE PROTEIN      Negative mg/dl 30 (1+) !    Glucose, UA      Negative mg/dl Negative    Ketones, UA      Negative mg/dl Trace !    Urobilinogen, UA      <2.0 mg/dl mg/dl <2.0    Bilirubin, UA      Negative  Negative    Blood, UA      Negative  Small !    RBC, UA      None Seen, 1-2 /hpf 20-30 !    WBC, UA      None Seen, 1-2 /hpf Innumerable !    Epithelial Cells      None Seen, Occasional /hpf Occasional    Bacteria, UA      None Seen, Occasional /hpf  Occasional    PREGNANCY, SERUM      Negative  Negative    Lipase      11 - 82 u/L 6 (L)                   MICRO  Urine and blood cultures pending  COVID/Flu/RSV negative          Imaging and other studies: I have personally reviewed pertinent reports.   and I have personally reviewed pertinent films in PACS    Pulmonary function testing: None for review    EKG, Pathology, and Other Studies: I have personally reviewed pertinent reports.      Code Status: Full code      Moody Pagan MD

## 2024-01-02 NOTE — SEPSIS NOTE
"  Sepsis Note   Amanda Low 23 y.o. female MRN: [id number]  Unit/Bed#: ICU 04 Encounter: [id number]       Initial Sepsis Screening       Row Name 01/01/24 1111                Is the patient's history suggestive of a new or worsening infection? Yes (Proceed)  -KD        Suspected source of infection urinary tract infection  -KD        Indicate SIRS criteria Tachypnea > 20 resp per min;Tachycardia > 90 bpm  -KD        Are two or more of the above signs & symptoms of infection both present and new to the patient? Yes (Proceed)  -KD        Assess for evidence of organ dysfunction: Are any of the below criteria present within 6 hours of suspected infection and SIRS criteria that are NOT considered to be chronic conditions? SBP < 90;Lactate > 2.0  -KD        Date of presentation of severe sepsis 01/01/24  -KD        Time of presentation of severe sepsis 2311  -KD        Date of presentation of septic shock 01/01/24  -KD        Time of presentation of septic shock 2311 -KD        Fluid Resuscitation: 30 ml/kg IV fluid bolus will be given based on actual body weight  -KD        Is the patient is persistently hypotensive in the hour after fluid bolus administration? If yes, patient meets criteria for vasopressor use. YES  -KD        Sepsis Note: Click \"NEXT\" below (NOT \"close\") to generate sepsis note based on above information. YES (proceed by clicking \"NEXT\")  -KD                  User Key  (r) = Recorded By, (t) = Taken By, (c) = Cosigned By      Initials Name Provider Type    KD JOHN Wallace Nurse Practitioner                        Body mass index is 29.76 kg/m².  Wt Readings from Last 1 Encounters:   01/01/24 86.2 kg (190 lb 0.6 oz)     IBW (Ideal Body Weight): 61.6 kg    Ideal body weight: 61.6 kg (135 lb 12.9 oz)  Adjusted ideal body weight: 71.4 kg (157 lb 7.9 oz)    Pt transported from [facility] to OR for stent placement and then to ICU post op. Already received more than 30ml/kg, and on norepi.  "

## 2024-01-02 NOTE — PLAN OF CARE
Problem: PAIN - ADULT  Goal: Verbalizes/displays adequate comfort level or baseline comfort level  Description: Interventions:  - Encourage patient to monitor pain and request assistance  - Assess pain using appropriate pain scale  - Administer analgesics based on type and severity of pain and evaluate response  - Implement non-pharmacological measures as appropriate and evaluate response  - Consider cultural and social influences on pain and pain management  - Notify physician/advanced practitioner if interventions unsuccessful or patient reports new pain  Outcome: Progressing     Problem: INFECTION - ADULT  Goal: Absence or prevention of progression during hospitalization  Description: INTERVENTIONS:  - Assess and monitor for signs and symptoms of infection  - Monitor lab/diagnostic results  - Monitor all insertion sites, i.e. indwelling lines, tubes, and drains  - Monitor endotracheal if appropriate and nasal secretions for changes in amount and color  - [place] appropriate cooling/warming therapies per order  - Administer medications as ordered  - Instruct and encourage patient and family to use good hand hygiene technique  - Identify and instruct in appropriate isolation precautions for identified infection/condition  Outcome: Progressing  Goal: Absence of fever/infection during neutropenic period  Description: INTERVENTIONS:  - Monitor WBC    Outcome: Progressing     Problem: SAFETY ADULT  Goal: Patient will remain free of falls  Description: INTERVENTIONS:  - Educate patient/family on patient safety including physical limitations  - Instruct patient to call for assistance with activity   - Consult OT/PT to assist with strengthening/mobility   - Keep Call bell within reach  - Keep bed low and locked with side rails adjusted as appropriate  - Keep care items and personal belongings within reach  - Initiate and maintain comfort rounds  - Make Fall Risk Sign visible to staff  - Offer Toileting every 2 Hours,  in advance of need  - Initiate/Maintain bedalarm  - Apply yellow socks and bracelet for high fall risk patients  - Consider moving patient to room near nurses station  Outcome: Progressing  Goal: Maintain or return to baseline ADL function  Description: INTERVENTIONS:  -  Assess patient's ability to carry out ADLs; assess patient's baseline for ADL function and identify physical deficits which impact ability to perform ADLs (bathing, care of mouth/teeth, toileting, grooming, dressing, etc.)  - Assess/evaluate cause of self-care deficits   - Assess range of motion  - Assess patient's mobility; develop plan if impaired  - Assess patient's need for assistive devices and provide as appropriate  - Encourage maximum independence but intervene and supervise when necessary  - Involve family in performance of ADLs  - Assess for home care needs following discharge   - Consider OT consult to assist with ADL evaluation and planning for discharge  - Provide patient education as appropriate  Outcome: Progressing  Goal: Maintains/Returns to pre admission functional level  Description: INTERVENTIONS:  - Perform AM-PAC 6 Click Basic Mobility/ Daily Activity assessment daily.  - Set and communicate daily mobility goal to care team and patient/family/caregiver.   - Collaborate with rehabilitation services on mobility goals if consulted    Problem: DISCHARGE PLANNING  Goal: Discharge to home or other facility with appropriate resources  Description: INTERVENTIONS:  - Identify barriers to discharge w/patient and caregiver  - Arrange for needed discharge resources and transportation as appropriate  - Identify discharge learning needs (meds, wound care, etc.)  - Arrange for interpretive services to assist at discharge as needed  - Refer to Case Management Department for coordinating discharge planning if the patient needs post-hospital services based on physician/advanced practitioner order or complex needs related to functional status,  cognitive ability, or social support system  Outcome: Progressing     Problem: Knowledge Deficit  Goal: Patient/family/caregiver demonstrates understanding of disease process, treatment plan, medications, and discharge instructions  Description: Complete learning assessment and assess knowledge base.  Interventions:  - Provide teaching at level of understanding  - Provide teaching via preferred learning methods  Outcome: Progressing     Problem: Nutrition/Hydration-ADULT  Goal: Nutrient/Hydration intake appropriate for improving, restoring or maintaining nutritional needs  Description: Monitor and assess patient's nutrition/hydration status for malnutrition. Collaborate with interdisciplinary team and initiate plan and interventions as ordered.  Monitor patient's weight and dietary intake as ordered or per policy. Utilize nutrition screening tool and intervene as necessary. Determine patient's food preferences and provide high-protein, high-caloric foods as appropriate.     INTERVENTIONS:  - Monitor oral intake, urinary output, labs, and treatment plans  - Assess nutrition and hydration status and recommend course of action  - Evaluate amount of meals eaten  - Assist patient with eating if necessary   - Allow adequate time for meals  - Recommend/ encourage appropriate diets, oral nutritional supplements, and vitamin/mineral supplements  - Order, calculate, and assess calorie counts as needed  - Recommend, monitor, and adjust tube feedings and TPN/PPN based on assessed needs  - Assess need for intravenous fluids  - Provide specific nutrition/hydration education as appropriate  - Include patient/family/caregiver in decisions related to nutrition  Outcome: Progressing

## 2024-01-02 NOTE — ASSESSMENT & PLAN NOTE
Creatinine on admission was 1.47, baseline 0.99  This is 2/2 obstructive kidney stone, now s/p stent  Continue with IV resuscitation and trend  Avoid nephrotoxic agents  Strict I/Os

## 2024-01-02 NOTE — PROGRESS NOTES
[facility]  Progress Note  Name: Amanda Low I  MRN: [id number]  Unit/Bed#: ICU 04 I Date of Admission: 1/1/2024   Date of Service: 1/2/2024 I Hospital Day: 1    Assessment/Plan   TERESSA (acute kidney injury) (HCC)  Assessment & Plan  Creatinine on admission was 1.47, baseline 0.99  This is 2/2 obstructive kidney stone, now s/p stent  Continue with IV resuscitation and trend  Avoid nephrotoxic agents  Strict I/Os    Right ureteral stone  Assessment & Plan  Now s/p cysto and stent placement POD 1  Monitor urine output  Pain control as needed    * Septic shock (HCC)  Assessment & Plan  2/2 to right ureteral stone, forniceal rupture, now s/p cysto and stent placement   Follow up culture results  Lactic cleared  Continue abx  Norepi to keep MAP>65, wean as able             Disposition: Critical care    ICU Core Measures     A: Assess, Prevent, and Manage Pain Has pain been assessed? Yes  Need for changes to pain regimen? No   B: Both SAT/SAT  N/A   C: Choice of Sedation RASS Goal: N/A patient not on sedation  Need for changes to sedation or analgesia regimen? No   D: Delirium CAM-ICU: Negative   E: Early Mobility  Plan for early mobility? Yes   F: Family Engagement Plan for family engagement today? Yes       Antibiotic Review: Awaiting culture results.     Review of Invasive Devices:    Borja Plan: Borja placed by urology. Removal plans per their team and Post operative urological requirements        Prophylaxis:  VTE VTE covered by:  heparin (porcine), Subcutaneous, 5,000 Units at 01/01/24 2353       Stress Ulcer  not ordered         Significant 24hr Events     24hr events: Presented to [facility] with flank pain. Found to have obstructive right renal calculi and septic shock. Transferred to [facility] for operative intervention, now s/p cysto and stent placement POD1.     Subjective   Review of Systems   HENT: Negative.     Respiratory: Negative.     Cardiovascular: Negative.    Gastrointestinal:  Negative.    Genitourinary:  Positive for flank pain and urgency.   Neurological: Negative.    Psychiatric/Behavioral: Negative.        Objective                            Vitals I/O      Most Recent Min/Max in 24hrs   Temp 100 °F (37.8 °C) Temp  Min: 97.9 °F (36.6 °C)  Max: 103.2 °F (39.6 °C)   Pulse (!) 112 Pulse  Min: 112  Max: 150   Resp (!) 23 Resp  Min: 18  Max: 31   BP 98/61 BP  Min: 78/53  Max: 170/78   O2 Sat 94 % SpO2  Min: 94 %  Max: 100 %      Intake/Output Summary (Last 24 hours) at 1/2/2024 0142  Last data filed at 1/2/2024 0000  Gross per 24 hour   Intake 3008.5 ml   Output 425 ml   Net 2583.5 ml       Diet Regular; Regular House    Invasive Monitoring           Physical Exam   Physical Exam  Eyes:      Extraocular Movements: Extraocular movements intact.      Pupils: Pupils are equal, round, and reactive to light.   Skin:     General: Skin is warm and dry.   HENT:      Head: Normocephalic and atraumatic.      Mouth/Throat:      Mouth: Mucous membranes are dry.   Neck:      Vascular: No JVD.   Cardiovascular:      Rate and Rhythm: Regular rhythm. Tachycardia present.      Pulses: Normal pulses.      Heart sounds: Normal heart sounds.   Musculoskeletal:      Right lower leg: No edema.      Left lower leg: No edema.   Abdominal: General: Bowel sounds are normal.      Palpations: Abdomen is soft.      Tenderness: There is no abdominal tenderness.   Constitutional:       General: She is not in acute distress.     Appearance: She is well-developed and well-nourished.   Pulmonary:      Effort: Pulmonary effort is normal.      Breath sounds: Normal breath sounds.   Neurological:      General: No focal deficit present.      Mental Status: She is alert and oriented to person, place and time.      Motor: Strength full and intact in all extremities.            Diagnostic Studies      EKG: ST  Imaging:  I have personally reviewed pertinent reports.       Medications:  Scheduled PRN   cefTRIAXone, 2,000 mg,  Q24H  chlorhexidine, 15 mL, Q12H BANDAR  heparin (porcine), 5,000 Units, Q8H BANDAR      acetaminophen, 650 mg, Q6H PRN  morphine injection, 4 mg, Q4H PRN  ondansetron, 4 mg, Q6H PRN  oxyCODONE, 5 mg, Q4H PRN   Or  oxyCODONE, 10 mg, Q4H PRN       Continuous    norepinephrine, 1-30 mcg/min, Last Rate: 3 mcg/min (01/02/24 0015)  sodium chloride, 125 mL/hr, Last Rate: 125 mL/hr (01/01/24 2348)         Labs:    CBC    Recent Labs     01/01/24  1616 01/02/24  0023   WBC 10.68*  --    HGB 13.7  --    HCT 40.9  --    * 65*     BMP    Recent Labs     01/01/24  1616   SODIUM 139   K 4.1      CO2 21   AGAP 13   BUN 17   CREATININE 1.47*   CALCIUM 9.4       Coags    No recent results     Additional Electrolytes  No recent results       Blood Gas    No recent results  No recent results LFTs  Recent Labs     01/01/24  1616   ALT 14   AST 16   ALKPHOS 43   ALB 4.7   TBILI 1.23*       Infectious  No recent results  Glucose  Recent Labs     01/01/24  1616   GLUC 140               Critical Care Time Delivered : Upon my evaluation, this patient had a high probability of imminent or life-threatening deterioration due to septic shock, which required my direct attention, intervention, and personal management.  I have personally provided 31 minutes of critical care time, exclusive of procedures, teaching, family meetings, and any prior time recorded by providers other than myself.     JOHN Wallace

## 2024-01-02 NOTE — ASSESSMENT & PLAN NOTE
Now s/p cysto and stent placement POD 1  Monitor urine output  Pain control as needed  Urology following, recommendations are appreciated

## 2024-01-02 NOTE — ED NOTES
Report given to VINH Diamond at [facility] ICU.      Felicita Pearson RN  01/01/24 2058

## 2024-01-02 NOTE — EMTALA/ACUTE CARE TRANSFER
[facility] EMERGENCY DEPARTMENT  [address]  [facility] [address]  Dept: [phone]      EMTALA TRANSFER CONSENT    NAME Amanda Low                                         2000                              MRN [id number]    I have been informed of my rights regarding examination, treatment, and transfer   by Dr. Paola Crow DO    Benefits: Specialized equipment and/or services available at the receiving facility (Include comment)________________________, Other benefits (Include comment)_______________________Urology    Risks: Potential for delay in receiving treatment, Other: (Include comment)__________________________      Transfer Request   I acknowledge that my medical condition has been evaluated and explained to me by the emergency department physician or other qualified medical person and/or my attending physician who has recommended and offered to me further medical examination and treatment. I understand the Hospital's obligation with respect to the treatment and stabilization of my emergency medical condition. I nevertheless request to be transferred. I release the Hospital, the doctor, and any other persons caring for me from all responsibility or liability for any injury or ill effects that may result from my transfer and agree to accept all responsibility for the consequences of my choice to transfer, rather than receive stabilizing treatment at the Hospital. I understand that because the transfer is my request, my insurance may not provide reimbursement for the services.  The Hospital will assist and direct me and my family in how to make arrangements for transfer, but the hospital is not liable for any fees charged by the transport service.  In spite of this understanding, I refuse to consent to further medical examination and treatment which has been offered to me, and request transfer to Accepting Facility Name, City & State : [facility]. I authorize  the performance of emergency medical procedures and treatments upon me in both transit and upon arrival at the receiving facility.  Additionally, I authorize the release of any and all medical records to the receiving facility and request they be transported with me, if possible.    I authorize the performance of emergency medical procedures and treatments upon me in both transit and upon arrival at the receiving facility.  Additionally, I authorize the release of any and all medical records to the receiving facility and request they be transported with me, if possible.  I understand that the safest mode of transportation during a medical emergency is an ambulance and that the Hospital advocates the use of this mode of transport. Risks of traveling to the receiving facility by car, including absence of medical control, life sustaining equipment, such as oxygen, and medical personnel has been explained to me and I fully understand them.    (ANGELICA CORRECT BOX BELOW)  [  ]  I consent to the stated transfer and to be transported by ambulance/helicopter.  [  ]  I consent to the stated transfer, but refuse transportation by ambulance and accept full responsibility for my transportation by car.  I understand the risks of non-ambulance transfers and I exonerate the Hospital and its staff from any deterioration in my condition that results from this refusal.    X___________________________________________    DATE  24  TIME________  Signature of patient or legally responsible individual signing on patient behalf           RELATIONSHIP TO PATIENT_________________________          Provider Certification    NAME Amanda Low                                        [facility] 2000                              MRN [id number]    A medical screening exam was performed on the above named patient.  Based on the examination:    Condition Necessitating Transfer The primary encounter diagnosis was Ureteral stone. Diagnoses of  Kidney stone and Sepsis (HCC) were also pertinent to this visit.    Patient Condition: The patient has been stabilized such that within reasonable medical probability, no material deterioration of the patient condition or the condition of the unborn child(clay) is likely to result from the transfer    Reason for Transfer: Level of Care needed not available at this facility, Other (Include comment)____________________    Transfer Requirements: Facility SLA   Space available and qualified personnel available for treatment as acknowledged by PACS  Agreed to accept transfer and to provide appropriate medical treatment as acknowledged by       Navdeep  Appropriate medical records of the examination and treatment of the patient are provided at the time of transfer   STAFF INITIAL WHEN COMPLETED _______  Transfer will be performed by qualified personnel from    and appropriate transfer equipment as required, including the use of necessary and appropriate life support measures.    Provider Certification: I have examined the patient and explained the following risks and benefits of being transferred/refusing transfer to the patient/family:  General risk, such as traffic hazards, adverse weather conditions, rough terrain or turbulence, possible failure of equipment (including vehicle or aircraft), or consequences of actions of persons outside the control of the transport personnel, Unanticipated needs of medical equipment and personnel during transport, Risk of worsening condition, The possibility of a transport vehicle being unavailable      Based on these reasonable risks and benefits to the patient and/or the unborn child(clay), and based upon the information available at the time of the patient’s examination, I certify that the medical benefits reasonably to be expected from the provision of appropriate medical treatments at another medical facility outweigh the increasing risks, if any, to the individual’s medical  condition, and in the case of labor to the unborn child, from effecting the transfer.    X____________________________________________ DATE 01/01/24        TIME_______      ORIGINAL - SEND TO MEDICAL RECORDS   COPY - SEND WITH PATIENT DURING TRANSFER

## 2024-01-02 NOTE — ANESTHESIA PREPROCEDURE EVALUATION
Procedure:  CYSTOSCOPY RETROGRADE PYELOGRAM WITH INSERTION STENT URETERAL (Right: Bladder)    Relevant Problems   No relevant active problems        Physical Exam    Airway    Mallampati score: II  TM Distance: >3 FB  Neck ROM: full     Dental       Cardiovascular  Rhythm: regular, Rate: normal, Cardiovascular exam normal    Pulmonary  Pulmonary exam normal Breath sounds clear to auscultation    Other Findings  post-pubertal.      Anesthesia Plan  ASA Score- 1 Emergent    Anesthesia Type- general with ASA Monitors.         Additional Monitors:     Airway Plan: ETT.           Plan Factors-Exercise tolerance (METS): >4 METS.    Chart reviewed.   Existing labs reviewed.     Patient is not a current smoker.              Induction- intravenous and rapid sequence induction.    Postoperative Plan-     Informed Consent- Anesthetic plan and risks discussed with patient.

## 2024-01-02 NOTE — PROGRESS NOTES
"Progress Note - Urology  Amanda Low 2000, 23 y.o. female MRN: [id number]    Unit/Bed#: ICU 04 Encounter: [id number]    Right ureteral stone,forniceal rupture   POD 1 cystoscopy and right ureteral stent   Borja draining clear yellow urine, likely dc next day or two when clinically stable.  Monitor temperatures and WBC   Pain controlled 2/10  Denies N/V  Will follow up for second stage procedure in couple weeks     Sepsis  Afebrile today  WBC 10-16  Tachycardic   Cotninue antibiotics  Follow urine culture         Subjective:   Amanda Low is a 23 year old female transferred yesterday from the [facility] for a septic right ureteral stone.  She is now POD 1 emergent cystoscopy and right ureteral stent insertion.  She is doing much better today and pain is controlled at a 2/10.  She remains afebrile today.  Denies nausea or vomiting and ate some of her breakfast this am.       Objective:    Vitals: Blood pressure 106/75, pulse (!) 108, temperature 98.8 °F (37.1 °C), temperature source Oral, resp. rate (!) 26, height 5' 7\" (1.702 m), weight 86.2 kg (190 lb 0.6 oz), SpO2 96%.,Body mass index is 29.76 kg/m².    Physical Exam  Vitals reviewed.   Constitutional:       Appearance: Normal appearance. She is ill-appearing.   HENT:      Head: Normocephalic and atraumatic.   Cardiovascular:      Rate and Rhythm: Tachycardia present.   Pulmonary:      Effort: Pulmonary effort is normal.      Breath sounds: Normal breath sounds.   Abdominal:      Tenderness: There is right CVA tenderness. There is no left CVA tenderness.   Genitourinary:     Comments: Borja draining clear yellow urine   Musculoskeletal:         General: Normal range of motion.      Cervical back: Normal range of motion.   Skin:     General: Skin is warm and dry.   Neurological:      General: No focal deficit present.      Mental Status: She is alert and oriented to person, place, and time.   Psychiatric:         Mood and Affect: Mood normal.       "   Behavior: Behavior normal.         Thought Content: Thought content normal.         Judgment: Judgment normal.             Labs:  Recent Labs     01/01/24  1616 01/02/24  0436   WBC 10.68* 16.12*       Recent Labs     01/01/24  1616 01/02/24  0436   HGB 13.7 10.2*     Recent Labs     01/01/24  1616 01/02/24  0436   HCT 40.9 31.2*     Recent Labs     01/01/24  1616 01/02/24  0436   CREATININE 1.47* 1.41*     Urine Culture: Pending    History:    Past Medical History:   Diagnosis Date    Acne vulgaris      Social History     Socioeconomic History    Marital status: Single     Spouse name: None    Number of children: None    Years of education: None    Highest education level: None   Occupational History    None   Tobacco Use    Smoking status: Some Days     Types: Cigarettes    Smokeless tobacco: Never   Vaping Use    Vaping status: Some Days    Substances: Nicotine, Flavoring   Substance and Sexual Activity    Alcohol use: Yes     Alcohol/week: 2.0 standard drinks of alcohol     Types: 2 Standard drinks or equivalent per week    Drug use: No    Sexual activity: Yes     Partners: Male     Birth control/protection: Condom Male   Other Topics Concern    None   Social History Narrative    Lives with parents     Working at [employer]; Going to [employer], studying health sciences (dental hygiene)      Social Determinants of Health     Financial Resource Strain: Not on file   Food Insecurity: Patient Declined (1/1/2024)    Hunger Vital Sign     Worried About Running Out of Food in the Last Year: Patient declined     Ran Out of Food in the Last Year: Patient declined   Transportation Needs: No Transportation Needs (1/1/2024)    PRAPARE - Transportation     Lack of Transportation (Medical): No     Lack of Transportation (Non-Medical): No   Physical Activity: Not on file   Stress: Not on file   Social Connections: Not on file   Intimate Partner Violence: Not on file   Housing Stability: Patient Declined (1/1/2024)     Housing Stability Vital Sign     Unable to Pay for Housing in the Last Year: Patient declined     Number of Places Lived in the Last Year: Not on file     Unstable Housing in the Last Year: Patient declined     Past Surgical History:   Procedure Laterality Date    NO PAST SURGERIES       Family History   Problem Relation Age of Onset    No Known Problems Mother     No Known Problems Father     Multiple sclerosis Maternal Grandmother     Diabetes Maternal Grandmother     Heart disease Maternal Grandfather     No Known Problems Paternal Grandmother     No Known Problems Paternal Grandfather     No Known Problems Sister     Alcohol abuse Neg Hx     Substance Abuse Neg Hx     Mental illness Neg Hx        Anneliese JOHN Fisher  Date: 1/2/2024 Time: 9:59 AM

## 2024-01-02 NOTE — ASSESSMENT & PLAN NOTE
Recent Labs     01/01/24  1616 01/02/24  0436   CREATININE 1.47* 1.41*   EGFR 50 53     Estimated Creatinine Clearance: 71.1 mL/min (A) (by C-G formula based on SCr of 1.41 mg/dL (H)).     TERESSA likely prerenal in setting of septic shock  Urine output still under 1 L for last 24 hrs despite fluid resuscitation  Borja catheter placed, draining scant urine  Continue IVF, monitor urine output  Trend BMP and electrolytes, replete as neded

## 2024-01-02 NOTE — UTILIZATION REVIEW
Initial Clinical Review    TRANSFER FROM [place]  ED    Admission: Date/Time/Statement:   Admission Orders (From admission, onward)       Ordered        01/01/24 2203  Inpatient Admission  Once                          Orders Placed This Encounter   Procedures    Inpatient Admission     Standing Status:   Standing     Number of Occurrences:   1     Order Specific Question:   Level of Care     Answer:   Critical Care [15]     Order Specific Question:   Estimated length of stay     Answer:   More than 2 Midnights     Order Specific Question:   Certification     Answer:   I certify that inpatient services are medically necessary for this patient for a duration of greater than two midnights. See H&P and MD Progress Notes for additional information about the patient's course of treatment.       Initial Presentation: 23 y.o. female   initially presented to ED at  [facility] with right flank/back pain for past  3 days, worsened. Was taking  NSAIDS  for pain,  was  manageable until the day of admission.   Did go out for New Years  [place], had  some alcohol.  Has  dysuria, nausea and vomiting the day of admission.  + fevers/chills   S/P  2.3  L IVF  in  ED. Given MATTHEW, IV toradol  and  IV MSO4  in ED.  Ct abdomen shows right ureteral calculus/hydronephrosis/  Transferred to [facility] for urological care.  No PMH.  Admit  Ip with  Septic Shock,   2nd  to ureteral stone, forniceal rupture,  Complicated UTI, TERESSA  and plan is  urology consult,  operative  intervention,  MATTHEW and blood/urine cultures.      SURGERY DATE: 1/1/2024   Procedure(s):  Right - CYSTOSCOPY RETROGRADE PYELOGRAM WITH INSERTION STENT URETERAL    Operative Findings:  Purulent urine  No signs of active urine leak/forniceal rupture  6x24 RIGHT JJ stent, no string  16F silicone zapata  ICU  post op         Date:  1/2      Day 2:   POD  #  1.   Zapata catheter remains intact.  Monitor labs and temps.   Pain controlled, afebrile. Follow  up cultures.     Wean   norepinephrine as  tolerated.  Continue  MATTHEW.         Wt Readings from Last 1 Encounters:   01/02/24 86.2 kg (190 lb 0.6 oz)     Additional Vital Signs:   1/02/24 0532 -- 104 25 Abnormal  97/59 75 93 % -- -- --   01/02/24 0530 -- 104 27 Abnormal  97/59 75 94 % -- -- --   01/02/24 0500 -- 108 Abnormal  27 Abnormal  97/60 72 95 % -- -- --   01/02/24 0430 -- 116 Abnormal  26 Abnormal  103/69 79 96 % -- -- --   01/02/24 0415 -- 108 Abnormal  21 94/56 73 94 % -- -- --   01/02/24 0400 99 °F (37.2 °C) 116 Abnormal  27 Abnormal  100/59 76 95 % None (Room air) -- Lying   01/02/24 0345 -- 116 Abnormal  26 Abnormal  100/65 73 96 % -- -- --   01/02/24 0330 -- 126 Abnormal  33 Abnormal  111/72 84 96 % -- -- --   01/02/24 0315 -- 114 Abnormal  20 104/67 78 94 % -- -- --   01/02/24 0300 -- 108 Abnormal  22 98/63 69 95 % -- -- --   01/02/24 0245 -- 110 Abnormal  24 Abnormal  93/62 75 95 % -- -- --   01/02/24 0230 -- 108 Abnormal  24 Abnormal  100/64 78 95 % -- -- --   01/02/24 0200 -- 126 Abnormal  26 Abnormal  86/60 Abnormal  75 95 % -- -- --   01/02/24 0115 -- 112 Abnormal  23 Abnormal  98/61 77 94 % -- -- --   01/02/24 0100 -- 114 Abnormal  24 Abnormal  89/58 Abnormal  72 95 % -- -- --   01/02/24 0030 -- 112 Abnormal  26 Abnormal  96/66 75 96 % -- -- --   01/02/24 0015 -- 120 Abnormal  21 99/67 77 97 % -- -- --   01/02/24 0000 -- 118 Abnormal  25 Abnormal  94/60 68 97 % None (Room air) -- Lying   01/01/24 2345 99.6 °F (37.6 °C) 118 Abnormal  20 95/64 74 99 % -- -- Lying   01/01/24 2331 -- 118 Abnormal  19 102/70 78 100 % None (Room air) -- --   01/01/24 2315 -- 122 Abnormal  -- 78/53 Abnormal  62 Abnormal  100 % None (Room air) -- --   01/01/24 2310 -- 120 Abnormal  -- 86/55 Abnormal  62 Abnormal  -- -- -- --   01/01/24 2300 -- 122 Abnormal  21 79/57 Abnormal  65 99 % None (Room air) X --   01/01/24 2253 100 °F (37.8 °C) 126 Abnormal  20 97/59 67 97 % None (Room air) X --   01/01/24 2200 -- 126 Abnormal  31 Abnormal  95/61 71 97  % -- -- --   01/01/24 2134 99.2 °F (37.3 °C) 126 Abnormal  31 Abnormal  96/57 74 97 % None (Room air) -- Lyin     Pertinent Labs/Diagnostic Test Results:     Results from last 7 days   Lab Units 01/01/24  1622   SARS-COV-2  Negative     Results from last 7 days   Lab Units 01/02/24  0436 01/02/24  0023 01/01/24  1616   WBC Thousand/uL 16.12*  --  10.68*   HEMOGLOBIN g/dL 10.2*  --  13.7   HEMATOCRIT % 31.2*  --  40.9   PLATELETS Thousands/uL 78* 65* 143*   NEUTROS ABS Thousands/µL  --   --  10.17*   BANDS PCT % 13*  --   --          Results from last 7 days   Lab Units 01/02/24  0436 01/01/24  1616   SODIUM mmol/L 138 139   POTASSIUM mmol/L 3.2* 4.1   CHLORIDE mmol/L 114* 105   CO2 mmol/L 17* 21   ANION GAP mmol/L 7 13   BUN mg/dL 17 17   CREATININE mg/dL 1.41* 1.47*   EGFR ml/min/1.73sq m 53 50   CALCIUM mg/dL 6.6* 9.4   MAGNESIUM mg/dL 1.0*  --    PHOSPHORUS mg/dL 3.0  --      Results from last 7 days   Lab Units 01/02/24  0436 01/01/24  1616   AST U/L 13 16   ALT U/L 11 14   ALK PHOS U/L 21* 43   TOTAL PROTEIN g/dL 4.7* 7.7   ALBUMIN g/dL 2.9* 4.7   TOTAL BILIRUBIN mg/dL 0.65 1.23*   BILIRUBIN DIRECT mg/dL 0.21*  --          Results from last 7 days   Lab Units 01/02/24  0436 01/01/24  1616   GLUCOSE RANDOM mg/dL 134 140               Results from last 7 days   Lab Units 01/01/24  1938 01/01/24  1616   LACTIC ACID mmol/L 1.6 3.2*           Results from last 7 days   Lab Units 01/01/24  1616   LIPASE u/L 6*                 Results from last 7 days   Lab Units 01/01/24  1725   CLARITY UA  Clear   COLOR UA  Light Yellow   SPEC GRAV UA  1.049*   PH UA  6.0   GLUCOSE UA mg/dl Negative   KETONES UA mg/dl Trace*   BLOOD UA  Small*   PROTEIN UA mg/dl 30 (1+)*   NITRITE UA  Positive*   BILIRUBIN UA  Negative   UROBILINOGEN UA (BE) mg/dl <2.0   LEUKOCYTES UA  Small*   WBC UA /hpf Innumerable*   RBC UA /hpf 20-30*   BACTERIA UA /hpf Occasional   EPITHELIAL CELLS WET PREP /hpf Occasional     Results from last 7 days   Lab  Units 01/01/24  1622   INFLUENZA A PCR  Negative   INFLUENZA B PCR  Negative   RSV PCR  Negative                             Results from last 7 days   Lab Units 01/01/24  1621 01/01/24  1616   GRAM STAIN RESULT  Gram negative rods* Gram negative rods*               Admitting Diagnosis: Ureteral stone [N20.1]  Age/Sex: 23 y.o. female  Admission Orders:  Scheduled Medications:  cefTRIAXone, 2,000 mg, Intravenous, Q24H  chlorhexidine, 15 mL, Mouth/Throat, Q12H BANDAR  heparin (porcine), 5,000 Units, Subcutaneous, Q8H BANDAR  potassium chloride, 40 mEq, Oral, BID before lunch/dinner      Continuous IV Infusions:  norepinephrine, 1-30 mcg/min, Intravenous, Titrated  sodium chloride, 125 mL/hr, Intravenous, Continuous      PRN Meds:  acetaminophen, 650 mg, Oral, Q6H PRN  morphine injection, 4 mg, Intravenous, Q4H PRN  ondansetron, 4 mg, Intravenous, Q6H PRN  oxyCODONE, 5 mg, Oral, Q4H PRN   Or  oxyCODONE, 10 mg, Oral, Q4H PRN        IP CONSULT TO CASE MANAGEMENT    Network Utilization Review Department  ATTENTION: Please call with any questions or concerns to [phone] and carefully listen to the prompts so that you are directed to the right person. All voicemails are confidential.   For Discharge needs, contact Care Management DC Support Team at [phone] opt. [phone]  Send all requests for admission clinical reviews, approved or denied determinations and any other requests to dedicated fax number below belonging to the campus where the patient is receiving treatment. List of dedicated fax numbers for the Facilities:  FACILITY NAME UR FAX NUMBER   ADMISSION DENIALS (Administrative/Medical Necessity) [phone]   DISCHARGE SUPPORT TEAM (NETWORK) [phone]   PARENT CHILD HEALTH (Maternity/NICU/Pediatrics) [phone]   [facility] [phone]   [facility] [phone]   [facility] [phone]   [facility]  [phone]   [facility] [phone]   [facility] [phone]   [facility] [phone]   [facility] [phone]   [facility] [phone]   [facility] [phone]   [facility] [phone]

## 2024-01-02 NOTE — ASSESSMENT & PLAN NOTE
2/2 to right ureteral stone, forniceal rupture, now s/p cysto and stent placement   Follow up culture results  Lactic cleared  Continue abx  Norepi to keep MAP>65, wean as able

## 2024-01-02 NOTE — QUICK NOTE
Urology was contacted about this patient at 1920.  Patient with obstructing stone, possible forniceal rupture, significant tachycardia progressing to high fevers of 102.4 and 103.2 on recheck.  Elevated blood pressure on arrival is declining.  Patient has lactic acidosis.    Patient received ceftriaxone and fluid resuscitation.    Given network urology and operative availability, I am requesting the patient be emergently prior to morning transfer to the [facility] where I will be performing other surgical cases.  I have spoken to the transfer center, the sending emergency room physician and the excepting ICU doctor who are in agreement.    Operating room and anesthesia team are on standby for urgent stent placement.  The patient is very likely to need critical care after manipulation.

## 2024-01-02 NOTE — OP NOTE
OPERATIVE REPORT  PATIENT NAME: Amanda Low    :  2000  MRN: [id number]  Pt Location: [facility] OR ROOM 01    SURGERY DATE: 2024    Surgeons and Role:     * Kee Sethi MD - Primary    Preop Diagnosis:  Ureteral stone [N20.1]    Post-Op Diagnosis Codes:     * Ureteral stone [N20.1]    Procedure(s):  Right - CYSTOSCOPY RETROGRADE PYELOGRAM WITH INSERTION STENT URETERAL    Specimen(s):  ID Type Source Tests Collected by Time Destination   A :  Urine Kidney, Right URINE CULTURE Kee Sethi MD [id number]        Estimated Blood Loss:   Minimal    Drains:  Urethral Catheter Latex 16 Fr. (Active)   Number of days: 0       Ureteral Internal Stent Right ureter 6 Fr. (Active)   Number of days: 0       Anesthesia Type:   General    Operative Indications:  Ureteral stone [N20.1]      Operative Findings:  Purulent urine  No signs of active urine leak/forniceal rupture  6x24 RIGHT JJ stent, no string  16F silicone zapata    Complications:   None    Procedure and Technique:  Amanda Low is a 23 y.o.-year-old female who presented and was found to have a 5 mm RIGHT proximal ureteral calculus.  Possible forniceal rupture and sepsis. Risk and benefits of cystoscopy with right ureteral stent insertion were discussed and reviewed.  The patient understands that I will not remove their stone at this time because ongoing sepsis and that they will require interval ureteroscopy in the future.  We discussed that ureteral stent cannot be consider permanent and will need to be removed or exchanged within 3-6 months. Informed consent was obtained.  The patient was brought to the operating room on 2024.    After the smooth induction of general (RSA) anesthesia, the patient was placed in the dorsal lithotomy position.  Her genitalia was prepped and draped in a sterile fashion.  Venodyne boots had been applied.  Intravenous antibiotics were administered in the form of vancomycin (cephalosporin administered  previously).  A timeout was performed with all members of the operative team confirming the patient's identity, procedure to be performed, and laterality of the case.    A 24 Stateless rigid cystoscope with 30° lens was inserted.  The bladder was thoroughly inspected. Attention was focused on the right ureteral orifice.  fluoroscopy demonstrated a nonradioopaque stone. Retained contrast noted. A Bard Solo Plus Wire was placed into the collecting system and passed proximal to the stone and cloudy-appearing urine came from the right ureteral orifice.  I then advanced the 5 Stateless open-ended catheter over the wire and into the right renal pelvis. The wire was removed and there was a hydronephrotic drip. This was collected and sent for culture. A gentle retrograde pyelogram was performed confirming hydronephrosis but no distinct forniceal rupture.  There were no other filling defects identified.      A wire was reinserted and placed into the upper pole calyx.  A 6 Stateless 24 double-J ureteral stent was then placed in the standard fashion.  The proximal coil was appreciated in the right renal pelvis and the distal coil was visualized within the bladder.  There was no string left in place.  The bladder was emptied with 16F silicone zapata.    Overall the patient tolerated the procedure well and there were no complications.  The patient was extubated in the operating room and transferred to the PACU in stable condition at the conclusion of the case.      Patient Disposition:  PACU         SIGNATURE: Kee Sethi MD  DATE: January 1, 2024  TIME: 10:43 PM

## 2024-01-02 NOTE — DISCHARGE INSTR - AVS FIRST PAGE
Dear Amanda Low,     It was our pleasure to care for you here at [facility].  It is our hope that we were always able to exceed the expected standards for your care during your stay.  You were hospitalized due to Infected uretal stone complicated by bacterimia .  You were cared for on the 2nd floor by Felipe Cervantes DO with the [facility] who covers for your primary care physician (PCP), Mitzi Cleveland DO, while you were hospitalized.  If you have any questions or concerns related to this hospitalization, you may contact us at .  For follow up as well as any medication refills, we recommend that you follow up with your primary care physician.  A registered nurse will reach out to you by phone within a few days after your discharge to answer any additional questions that you may have after going home.  However, at this time we provide for you here, the most important instructions / recommendations at discharge:     Notable Medication Adjustments -   Cefpodoxime to complete a course of abx for next 8 days  Florastor to help with preventing GI side effects  Fluconazole if you have a yeast infection  Testing Required after Discharge -   CBC in one week - Blood test, this has been ordered  Important follow up information -   See Below  Other Instructions -   Your stone was not addressed during today's surgery and you will absolutely need to return for interval stone surgery. This next procedure will be coordinated by our office. Please be aware that failure to followup can result in dangerous complication as the ureteral stent cannot remain in place indefinitely.    After your surgery today, a stent was left coiled in your kidney/ureter/bladder. This tube will help the area to safely heal and urine to drain unobstructed.     The stent placed is not permanent and cannot be left in your body indefinitely it will be exchanged at the time of  your next surgery and then removed after.     It is common to have some mild symptoms while the stent is in place. Pain with urination, feeling of needing to urinate frequency or urgently, flank discomfort or blood in the urine. Utilize the medications provided (flomax, ditropan, pyridium) as well as ibuprofen/motrin for pain control. Hydrate liberally with oral fluids.    If there are issues at home, our office should be notified at [phone].  Please review this entire after visit summary as additional general instructions including medication list, appointments, activity, diet, any pertinent wound care, and other additional recommendations from your care team that may be provided for you.      Sincerely,     Felipe Cervantes DO and Nurse Amanda BLACKWOOD

## 2024-01-02 NOTE — PLAN OF CARE
Problem: PAIN - ADULT  Goal: Verbalizes/displays adequate comfort level or baseline comfort level  Description: Interventions:  - Encourage patient to monitor pain and request assistance  - Assess pain using appropriate pain scale  - Administer analgesics based on type and severity of pain and evaluate response  - Implement non-pharmacological measures as appropriate and evaluate response  - Consider cultural and social influences on pain and pain management  - Notify physician/advanced practitioner if interventions unsuccessful or patient reports new pain  Outcome: Progressing     Problem: INFECTION - ADULT  Goal: Absence or prevention of progression during hospitalization  Description: INTERVENTIONS:  - Assess and monitor for signs and symptoms of infection  - Monitor lab/diagnostic results  - Monitor all insertion sites, i.e. indwelling lines, tubes, and drains  - Monitor endotracheal if appropriate and nasal secretions for changes in amount and color  - [place] appropriate cooling/warming therapies per order  - Administer medications as ordered  - Instruct and encourage patient and family to use good hand hygiene technique  - Identify and instruct in appropriate isolation precautions for identified infection/condition  Outcome: Progressing  Goal: Absence of fever/infection during neutropenic period  Description: INTERVENTIONS:  - Monitor WBC    Outcome: Progressing     Problem: SAFETY ADULT  Goal: Patient will remain free of falls  Description: INTERVENTIONS:  - Educate patient/family on patient safety including physical limitations  - Instruct patient to call for assistance with activity   - Consult OT/PT to assist with strengthening/mobility   - Keep Call bell within reach  - Keep bed low and locked with side rails adjusted as appropriate  - Keep care items and personal belongings within reach  - Initiate and maintain comfort rounds  - Make Fall Risk Sign visible to staff  - Offer Toileting in advance of  need  - Initiate/Maintain bed alarm  - Obtain necessary fall risk management equipment  - Apply yellow socks and bracelet for high fall risk patients  - Consider moving patient to room near nurses station  Outcome: Progressing  Goal: Maintain or return to baseline ADL function  Description: INTERVENTIONS:  -  Assess patient's ability to carry out ADLs; assess patient's baseline for ADL function and identify physical deficits which impact ability to perform ADLs (bathing, care of mouth/teeth, toileting, grooming, dressing, etc.)  - Assess/evaluate cause of self-care deficits   - Assess range of motion  - Assess patient's mobility; develop plan if impaired  - Assess patient's need for assistive devices and provide as appropriate  - Encourage maximum independence but intervene and supervise when necessary  - Involve family in performance of ADLs  - Assess for home care needs following discharge   - Consider OT consult to assist with ADL evaluation and planning for discharge  - Provide patient education as appropriate  Outcome: Progressing  Goal: Maintains/Returns to pre admission functional level  Description: INTERVENTIONS:  - Perform AM-PAC 6 Click Basic Mobility/ Daily Activity assessment daily.  - Set and communicate daily mobility goal to care team and patient/family/caregiver.   - Collaborate with rehabilitation services on mobility goals if consulted  - Out of bed for toileting  - Record patient progress and toleration of activity level   Outcome: Progressing     Problem: Knowledge Deficit  Goal: Patient/family/caregiver demonstrates understanding of disease process, treatment plan, medications, and discharge instructions  Description: Complete learning assessment and assess knowledge base.  Interventions:  - Provide teaching at level of understanding  - Provide teaching via preferred learning methods  Outcome: Progressing     Problem: Nutrition/Hydration-ADULT  Goal: Nutrient/Hydration intake appropriate for  improving, restoring or maintaining nutritional needs  Description: Monitor and assess patient's nutrition/hydration status for malnutrition. Collaborate with interdisciplinary team and initiate plan and interventions as ordered.  Monitor patient's weight and dietary intake as ordered or per policy. Utilize nutrition screening tool and intervene as necessary. Determine patient's food preferences and provide high-protein, high-caloric foods as appropriate.     INTERVENTIONS:  - Monitor oral intake, urinary output, labs, and treatment plans  - Assess nutrition and hydration status and recommend course of action  - Evaluate amount of meals eaten  - Assist patient with eating if necessary   - Allow adequate time for meals  - Recommend/ encourage appropriate diets, oral nutritional supplements, and vitamin/mineral supplements  - Order, calculate, and assess calorie counts as needed  - Recommend, monitor, and adjust tube feedings and TPN/PPN based on assessed needs  - Assess need for intravenous fluids  - Provide specific nutrition/hydration education as appropriate  - Include patient/family/caregiver in decisions related to nutrition  Outcome: Progressing

## 2024-01-03 LAB
ANION GAP SERPL CALCULATED.3IONS-SCNC: 4 MMOL/L
BUN SERPL-MCNC: 19 MG/DL (ref 5–25)
CALCIUM SERPL-MCNC: 7.9 MG/DL (ref 8.4–10.2)
CHLORIDE SERPL-SCNC: 110 MMOL/L (ref 96–108)
CO2 SERPL-SCNC: 20 MMOL/L (ref 21–32)
CREAT SERPL-MCNC: 1 MG/DL (ref 0.6–1.3)
ERYTHROCYTE [DISTWIDTH] IN BLOOD BY AUTOMATED COUNT: 13.4 % (ref 11.6–15.1)
GFR SERPL CREATININE-BSD FRML MDRD: 80 ML/MIN/1.73SQ M
GLUCOSE SERPL-MCNC: 92 MG/DL (ref 65–140)
HCT VFR BLD AUTO: 30.7 % (ref 34.8–46.1)
HGB BLD-MCNC: 10.1 G/DL (ref 11.5–15.4)
MCH RBC QN AUTO: 28.5 PG (ref 26.8–34.3)
MCHC RBC AUTO-ENTMCNC: 32.9 G/DL (ref 31.4–37.4)
MCV RBC AUTO: 87 FL (ref 82–98)
PLATELET # BLD AUTO: 75 THOUSANDS/UL (ref 149–390)
PMV BLD AUTO: 11.6 FL (ref 8.9–12.7)
POTASSIUM SERPL-SCNC: 4.2 MMOL/L (ref 3.5–5.3)
RBC # BLD AUTO: 3.55 MILLION/UL (ref 3.81–5.12)
SODIUM SERPL-SCNC: 134 MMOL/L (ref 135–147)
WBC # BLD AUTO: 12.71 THOUSAND/UL (ref 4.31–10.16)

## 2024-01-03 PROCEDURE — 85027 COMPLETE CBC AUTOMATED: CPT | Performed by: INTERNAL MEDICINE

## 2024-01-03 PROCEDURE — 99233 SBSQ HOSP IP/OBS HIGH 50: CPT | Performed by: INTERNAL MEDICINE

## 2024-01-03 PROCEDURE — 80048 BASIC METABOLIC PNL TOTAL CA: CPT | Performed by: INTERNAL MEDICINE

## 2024-01-03 RX ORDER — ENOXAPARIN SODIUM 100 MG/ML
40 INJECTION SUBCUTANEOUS
Status: DISCONTINUED | OUTPATIENT
Start: 2024-01-04 | End: 2024-01-04 | Stop reason: HOSPADM

## 2024-01-03 RX ORDER — BUTALBITAL, ACETAMINOPHEN AND CAFFEINE 50; 325; 40 MG/1; MG/1; MG/1
2 TABLET ORAL ONCE
Status: COMPLETED | OUTPATIENT
Start: 2024-01-03 | End: 2024-01-03

## 2024-01-03 RX ORDER — METOCLOPRAMIDE HYDROCHLORIDE 5 MG/ML
10 INJECTION INTRAMUSCULAR; INTRAVENOUS EVERY 6 HOURS PRN
Status: DISCONTINUED | OUTPATIENT
Start: 2024-01-03 | End: 2024-01-04

## 2024-01-03 RX ADMIN — OXYCODONE HYDROCHLORIDE 10 MG: 10 TABLET ORAL at 05:32

## 2024-01-03 RX ADMIN — ACETAMINOPHEN 325MG 650 MG: 325 TABLET ORAL at 10:24

## 2024-01-03 RX ADMIN — BUTALBITAL, ACETAMINOPHEN, AND CAFFEINE 2 TABLET: 325; 50; 40 TABLET ORAL at 17:02

## 2024-01-03 RX ADMIN — HEPARIN SODIUM 5000 UNITS: 5000 INJECTION INTRAVENOUS; SUBCUTANEOUS at 05:32

## 2024-01-03 RX ADMIN — HEPARIN SODIUM 5000 UNITS: 5000 INJECTION INTRAVENOUS; SUBCUTANEOUS at 14:28

## 2024-01-03 RX ADMIN — ONDANSETRON 4 MG: 4 TABLET, ORALLY DISINTEGRATING ORAL at 05:32

## 2024-01-03 RX ADMIN — ACETAMINOPHEN 325MG 650 MG: 325 TABLET ORAL at 22:57

## 2024-01-03 RX ADMIN — CEFTRIAXONE 2000 MG: 2 INJECTION, SOLUTION INTRAVENOUS at 10:24

## 2024-01-03 RX ADMIN — METOCLOPRAMIDE 10 MG: 5 INJECTION, SOLUTION INTRAMUSCULAR; INTRAVENOUS at 11:37

## 2024-01-03 NOTE — ANESTHESIA POSTPROCEDURE EVALUATION
Post-Op Assessment Note    CV Status:  Stable  Pain Score: 2    Pain management: adequate       Mental Status:  Alert and awake   Hydration Status:  Euvolemic and stable   PONV Controlled:  Controlled   Airway Patency:  Patent     Post Op Vitals Reviewed: Yes      Staff: Anesthesiologist               BP      Temp      Pulse     Resp      SpO2

## 2024-01-03 NOTE — PLAN OF CARE
Problem: PAIN - ADULT  Goal: Verbalizes/displays adequate comfort level or baseline comfort level  Description: Interventions:  - Encourage patient to monitor pain and request assistance  - Assess pain using appropriate pain scale  - Administer analgesics based on type and severity of pain and evaluate response  - Implement non-pharmacological measures as appropriate and evaluate response  - Consider cultural and social influences on pain and pain management  - Notify physician/advanced practitioner if interventions unsuccessful or patient reports new pain  Outcome: Progressing     Problem: INFECTION - ADULT  Goal: Absence or prevention of progression during hospitalization  Description: INTERVENTIONS:  - Assess and monitor for signs and symptoms of infection  - Monitor lab/diagnostic results  - Monitor all insertion sites, i.e. indwelling lines, tubes, and drains  - Monitor endotracheal if appropriate and nasal secretions for changes in amount and color  - [place] appropriate cooling/warming therapies per order  - Administer medications as ordered  - Instruct and encourage patient and family to use good hand hygiene technique  - Identify and instruct in appropriate isolation precautions for identified infection/condition  Outcome: Progressing  Goal: Absence of fever/infection during neutropenic period  Description: INTERVENTIONS:  - Monitor WBC    Outcome: Progressing     Problem: SAFETY ADULT  Goal: Patient will remain free of falls  Description: INTERVENTIONS:  - Educate patient/family on patient safety including physical limitations  - Instruct patient to call for assistance with activity   - Consult OT/PT to assist with strengthening/mobility   - Keep Call bell within reach  - Keep bed low and locked with side rails adjusted as appropriate  - Keep care items and personal belongings within reach  - Initiate and maintain comfort rounds  - Make Fall Risk Sign visible to staff  - Offer Toileting every 2 Hours,  in advance of need  - Initiate/Maintain bed alarm  - Obtain necessary fall risk management equipment: alarm   - Apply yellow socks and bracelet for high fall risk patients  - Consider moving patient to room near nurses station  Outcome: Progressing  Goal: Maintain or return to baseline ADL function  Description: INTERVENTIONS:  -  Assess patient's ability to carry out ADLs; assess patient's baseline for ADL function and identify physical deficits which impact ability to perform ADLs (bathing, care of mouth/teeth, toileting, grooming, dressing, etc.)  - Assess/evaluate cause of self-care deficits   - Assess range of motion  - Assess patient's mobility; develop plan if impaired  - Assess patient's need for assistive devices and provide as appropriate  - Encourage maximum independence but intervene and supervise when necessary  - Involve family in performance of ADLs  - Assess for home care needs following discharge   - Consider OT consult to assist with ADL evaluation and planning for discharge  - Provide patient education as appropriate  Outcome: Progressing  Goal: Maintains/Returns to pre admission functional level  Description: INTERVENTIONS:  - Perform AM-PAC 6 Click Basic Mobility/ Daily Activity assessment daily.  - Set and communicate daily mobility goal to care team and patient/family/caregiver.   - Collaborate with rehabilitation services on mobility goals if consulted  - Perform Range of Motion 4 times a day.  - Reposition patient every 2 hours.  - Dangle patient 3 times a day  - Stand patient 3 times a day  - Ambulate patient 3 times a day  - Out of bed to chair 3 times a day   - Out of bed for meals 3 times a day  - Out of bed for toileting  - Record patient progress and toleration of activity level   Outcome: Progressing     Problem: DISCHARGE PLANNING  Goal: Discharge to home or other facility with appropriate resources  Description: INTERVENTIONS:  - Identify barriers to discharge w/patient and  caregiver  - Arrange for needed discharge resources and transportation as appropriate  - Identify discharge learning needs (meds, wound care, etc.)  - Arrange for interpretive services to assist at discharge as needed  - Refer to Case Management Department for coordinating discharge planning if the patient needs post-hospital services based on physician/advanced practitioner order or complex needs related to functional status, cognitive ability, or social support system  Outcome: Progressing     Problem: Knowledge Deficit  Goal: Patient/family/caregiver demonstrates understanding of disease process, treatment plan, medications, and discharge instructions  Description: Complete learning assessment and assess knowledge base.  Interventions:  - Provide teaching at level of understanding  - Provide teaching via preferred learning methods  Outcome: Progressing     Problem: Nutrition/Hydration-ADULT  Goal: Nutrient/Hydration intake appropriate for improving, restoring or maintaining nutritional needs  Description: Monitor and assess patient's nutrition/hydration status for malnutrition. Collaborate with interdisciplinary team and initiate plan and interventions as ordered.  Monitor patient's weight and dietary intake as ordered or per policy. Utilize nutrition screening tool and intervene as necessary. Determine patient's food preferences and provide high-protein, high-caloric foods as appropriate.     INTERVENTIONS:  - Monitor oral intake, urinary output, labs, and treatment plans  - Assess nutrition and hydration status and recommend course of action  - Evaluate amount of meals eaten  - Assist patient with eating if necessary   - Allow adequate time for meals  - Recommend/ encourage appropriate diets, oral nutritional supplements, and vitamin/mineral supplements  - Order, calculate, and assess calorie counts as needed  - Recommend, monitor, and adjust tube feedings and TPN/PPN based on assessed needs  - Assess need for  intravenous fluids  - Provide specific nutrition/hydration education as appropriate  - Include patient/family/caregiver in decisions related to nutrition  Outcome: Progressing

## 2024-01-04 ENCOUNTER — TRANSITIONAL CARE MANAGEMENT (OUTPATIENT)
Dept: FAMILY MEDICINE CLINIC | Facility: CLINIC | Age: 24
End: 2024-01-04

## 2024-01-04 ENCOUNTER — TELEPHONE (OUTPATIENT)
Dept: UROLOGY | Facility: MEDICAL CENTER | Age: 24
End: 2024-01-04

## 2024-01-04 VITALS
WEIGHT: 190.04 LBS | SYSTOLIC BLOOD PRESSURE: 134 MMHG | TEMPERATURE: 99.3 F | OXYGEN SATURATION: 95 % | BODY MASS INDEX: 29.83 KG/M2 | RESPIRATION RATE: 16 BRPM | HEART RATE: 91 BPM | DIASTOLIC BLOOD PRESSURE: 95 MMHG | HEIGHT: 67 IN

## 2024-01-04 PROBLEM — D69.6 THROMBOCYTOPENIA (HCC): Status: ACTIVE | Noted: 2024-01-04

## 2024-01-04 LAB
ANION GAP SERPL CALCULATED.3IONS-SCNC: 6 MMOL/L
BACTERIA BLD CULT: ABNORMAL
BACTERIA UR CULT: ABNORMAL
BACTERIA UR CULT: ABNORMAL
BUN SERPL-MCNC: 14 MG/DL (ref 5–25)
CALCIUM SERPL-MCNC: 8.1 MG/DL (ref 8.4–10.2)
CHLORIDE SERPL-SCNC: 110 MMOL/L (ref 96–108)
CO2 SERPL-SCNC: 20 MMOL/L (ref 21–32)
CREAT SERPL-MCNC: 0.78 MG/DL (ref 0.6–1.3)
E COLI DNA BLD POS QL NAA+NON-PROBE: DETECTED
ERYTHROCYTE [DISTWIDTH] IN BLOOD BY AUTOMATED COUNT: 13.1 % (ref 11.6–15.1)
GFR SERPL CREATININE-BSD FRML MDRD: 109 ML/MIN/1.73SQ M
GLUCOSE SERPL-MCNC: 91 MG/DL (ref 65–140)
GRAM STN SPEC: ABNORMAL
GRAM STN SPEC: ABNORMAL
HCT VFR BLD AUTO: 31.1 % (ref 34.8–46.1)
HGB BLD-MCNC: 10.3 G/DL (ref 11.5–15.4)
MCH RBC QN AUTO: 28.4 PG (ref 26.8–34.3)
MCHC RBC AUTO-ENTMCNC: 33.1 G/DL (ref 31.4–37.4)
MCV RBC AUTO: 86 FL (ref 82–98)
PLATELET # BLD AUTO: 86 THOUSANDS/UL (ref 149–390)
PMV BLD AUTO: 11.7 FL (ref 8.9–12.7)
POTASSIUM SERPL-SCNC: 3.8 MMOL/L (ref 3.5–5.3)
RBC # BLD AUTO: 3.63 MILLION/UL (ref 3.81–5.12)
SODIUM SERPL-SCNC: 136 MMOL/L (ref 135–147)
WBC # BLD AUTO: 12.55 THOUSAND/UL (ref 4.31–10.16)

## 2024-01-04 PROCEDURE — 80048 BASIC METABOLIC PNL TOTAL CA: CPT | Performed by: INTERNAL MEDICINE

## 2024-01-04 PROCEDURE — 99239 HOSP IP/OBS DSCHRG MGMT >30: CPT | Performed by: INTERNAL MEDICINE

## 2024-01-04 PROCEDURE — 85027 COMPLETE CBC AUTOMATED: CPT | Performed by: INTERNAL MEDICINE

## 2024-01-04 RX ORDER — CEFPODOXIME PROXETIL 200 MG/1
200 TABLET, FILM COATED ORAL 2 TIMES DAILY
Qty: 16 TABLET | Refills: 0 | Status: SHIPPED | OUTPATIENT
Start: 2024-01-04 | End: 2024-01-04

## 2024-01-04 RX ORDER — FLUCONAZOLE 150 MG/1
150 TABLET ORAL ONCE
Qty: 1 TABLET | Refills: 0 | Status: SHIPPED | OUTPATIENT
Start: 2024-01-04 | End: 2024-01-04

## 2024-01-04 RX ORDER — CEFPODOXIME PROXETIL 200 MG/1
200 TABLET, FILM COATED ORAL 2 TIMES DAILY
Qty: 16 TABLET | Refills: 0 | Status: SHIPPED | OUTPATIENT
Start: 2024-01-04 | End: 2024-01-12

## 2024-01-04 RX ORDER — METOCLOPRAMIDE HYDROCHLORIDE 5 MG/ML
10 INJECTION INTRAMUSCULAR; INTRAVENOUS EVERY 6 HOURS PRN
Status: DISCONTINUED | OUTPATIENT
Start: 2024-01-04 | End: 2024-01-04 | Stop reason: HOSPADM

## 2024-01-04 RX ADMIN — ENOXAPARIN SODIUM 40 MG: 40 INJECTION SUBCUTANEOUS at 08:32

## 2024-01-04 RX ADMIN — CEFTRIAXONE 2000 MG: 2 INJECTION, SOLUTION INTRAVENOUS at 09:31

## 2024-01-04 RX ADMIN — METOCLOPRAMIDE 10 MG: 5 INJECTION, SOLUTION INTRAMUSCULAR; INTRAVENOUS at 08:33

## 2024-01-04 RX ADMIN — METOCLOPRAMIDE 10 MG: 5 INJECTION, SOLUTION INTRAMUSCULAR; INTRAVENOUS at 01:20

## 2024-01-04 RX ADMIN — OXYCODONE HYDROCHLORIDE 10 MG: 10 TABLET ORAL at 08:32

## 2024-01-04 NOTE — ASSESSMENT & PLAN NOTE
Presented with sepsis of a urinary etiology requiring ICU admission  Blood pressure improved  Found to have E. coli bacteremia, remains on ceftriaxone 2 g every 24 hours  Transition to cefpodoxime  Urine culture with E. coli greater than 100,000 units

## 2024-01-04 NOTE — NURSING NOTE
Discharge instructions for urology follow up appointments and medications reviewed with patient and mother who verbalized understanding. All questions and concerns addressed. IV, zapata,  and briseida removed prior to discharge. PCA escorted patient off unit.

## 2024-01-04 NOTE — H&P (VIEW-ONLY)
[facility]  Progress Note  Name: Amanda Low I  MRN: [id number]  Unit/Bed#: [place] [address] -01 I Date of Admission: 1/1/2024   Date of Service: 1/3/2024 I Hospital Day: 2    Assessment/Plan   * Septic shock (HCC)  Assessment & Plan  Presented with sepsis of a urinary etiology requiring ICU admission  Blood pressure improved  Found to have E. coli bacteremia, remains on ceftriaxone 2 g every 24 hours  Cultures currently pending susceptibilities  Urine culture with E. coli greater than 100,000 units    TERESSA (acute kidney injury) ([facility])  Assessment & Plan  Secondary to acute infection  Initially required ICU level care  Improving, renal function back to baseline      Right ureteral stone  Assessment & Plan  Patient underwent cystoscopy with stent placement for infected right ureteral stone  Found to have 5 mm right proximal ureteral calculus  Patient underwent cystoscopy with right ureteral stent insertion on January 1, 2024  Due to concern for sepsis as well as hypotension required ICU level care  Ureteral stent was placed, patient will need second stage procedure for stone retrieval in 3 to 6 months                   Hospital Course:     23-year-old female patient presenting with right flank pain, found to have sepsis of urinary etiology secondary to infected right nephrolithiasis.  Underwent operative management on January 1, 2024 with stent placement.  She will need second stage procedure for definitive treatment.    Assessment:      Principal Problem:    Septic shock (HCC)  Active Problems:    Right ureteral stone    TERESSA (acute kidney injury) ([facility])      Plan:    Continue IV ceftriaxone  Repeat labs in a.m.  Await culture and sensitivity       VTE Pharmacologic Prophylaxis:   Pharmacologic: Enoxaparin (Lovenox)  Mechanical VTE Prophylaxis in Place: Yes    AM-PAC Basic Mobility:  Basic Mobility Inpatient Raw Score: 20    -[facility] Achieved: 4: Move to chair/commode  -[facility] Goal: 6: Walk 10  steps or more    HLM Goal listed above. Continue with multidisciplinary rounding and encourage appropriate mobility to improve upon HLM goals.         Patient Centered Rounds: Case discussed and reviewed with nursing    Discussions with Specialists or Other Care Team Provider: Case management    Education and Discussions with Family / Patient: Patient family at bedside    Time Spent for Care: 80 minutes.  More than 50% of total time spent on counseling and coordination of care as described above.    Current Length of Stay: 2 day(s)    Current Patient Status: Inpatient   Certification Statement: The patient will continue to require additional inpatient hospital stay due to bacteremia    Discharge Plan / Estimated Discharge Date: 24 to 48 hours pending culture results    Code Status: Level 1 - Full Code      Subjective:   Seen and examined, no acute complaints.  Did have headache today was given Reglan as well as Fioricet.    A complete and comprehensive 14 point organ system review has been performed and all other systems are negative other than stated above.    Objective:     Vitals:   Temp (24hrs), Av.3 °F (37.4 °C), Min:98.8 °F (37.1 °C), Max:100.1 °F (37.8 °C)    Temp:  [98.8 °F (37.1 °C)-100.1 °F (37.8 °C)] 98.8 °F (37.1 °C)  HR:  [104-111] 109  Resp:  [16] 16  BP: (102-132)/(69-94) 132/94  SpO2:  [95 %-97 %] 96 %  Body mass index is 30.14 kg/m².     Input and Output Summary (last 24 hours):       Intake/Output Summary (Last 24 hours) at 1/3/2024 2046  Last data filed at 1/3/2024 1642  Gross per 24 hour   Intake --   Output 2150 ml   Net -2150 ml       Physical Exam:     General: well appearing, no acute distress  HEENT: atraumatic, PERRLA, moist mucosa, normal pharynx, normal tonsils and adenoids, normal tongue, no fluid in sinuses  Neck: Trachea midline, no carotid bruit, no masses  Respiratory: normal chest wall expansion, CTA B, no r/r/w, no rubs  Cardiovascular: RRR, no m/r/g, Normal S1 and S2  Abdomen:  Soft, non-tender, non-distended, normal bowel sounds in all quadrants, no hepatosplenomegaly, no tympany  Rectal: deferred  Musculoskeletal: normal ROM in upper and lower extremities  Integumentary: warm, dry, and pink, with no rash, purpura, or petechia  Heme/Lymph: no lymphadenopathy, no bruises  Neurological: Cranial Nerves II-XII grossly intact  Psychiatric: cooperative with normal mood, affect, and cognition      Additional Data:     Labs:    Results from last 7 days   Lab Units 01/03/24  0955 01/02/24  0436 01/02/24  0023 01/01/24  1616   WBC Thousand/uL 12.71* 16.12*  --  10.68*   HEMOGLOBIN g/dL 10.1* 10.2*  --  13.7   HEMATOCRIT % 30.7* 31.2*  --  40.9   PLATELETS Thousands/uL 75* 78*   < > 143*   NEUTROS PCT %  --   --   --  95*   LYMPHS PCT %  --   --   --  4*   LYMPHO PCT %  --  1*  --   --    MONOS PCT %  --   --   --  1*   MONO PCT %  --  6  --   --    EOS PCT %  --  0  --  0    < > = values in this interval not displayed.     Results from last 7 days   Lab Units 01/03/24  0955 01/02/24  0436   POTASSIUM mmol/L 4.2 3.2*   CHLORIDE mmol/L 110* 114*   CO2 mmol/L 20* 17*   BUN mg/dL 19 17   CREATININE mg/dL 1.00 1.41*   CALCIUM mg/dL 7.9* 6.6*   ALK PHOS U/L  --  21*   ALT U/L  --  11   AST U/L  --  13           * I Have Reviewed All Lab Data Listed Above.  * Additional Pertinent Lab Tests Reviewed: All Labs For Current Hospital Admission Reviewed    Imaging:    Imaging Reports Reviewed Today Include: Reviewed operative report from the urology service  Imaging Personally Reviewed by Myself Includes: Reviewed    Recent Cultures (last 7 days):     Results from last 7 days   Lab Units 01/01/24 2238 01/01/24  1725 01/01/24  1621 01/01/24  1616   BLOOD CULTURE   --   --  Escherichia coli* Escherichia coli*   GRAM STAIN RESULT   --   --  Gram negative rods* Gram negative rods*   URINE CULTURE  50,000-59,000 cfu/ml Escherichia coli* >100,000 cfu/ml Escherichia coli*  --   --        Last 24 Hours Medication  List:   Current Facility-Administered Medications   Medication Dose Route Frequency Provider Last Rate    acetaminophen  650 mg Oral Q6H PRN Edil Greenberg MD      cefTRIAXone  2,000 mg Intravenous Q24H Kee Sethi MD 2,000 mg (01/03/24 1024)    [START ON 1/4/2024] enoxaparin  40 mg Subcutaneous Q24H [facility] Felipe Cervantes DO      metoclopramide  10 mg Intravenous Q6H PRN Felipe Cervantes DO      morphine injection  2 mg Intravenous Q4H PRN Ida Ervin PA-C      ondansetron  4 mg Oral Q6H PRN Edil Greenberg MD      oxyCODONE  10 mg Oral Q4H PRN Edil Greenberg MD      oxyCODONE  5 mg Oral Q4H PRN Edil Greenberg MD         AM-PAC Basic Mobility:  Basic Mobility Inpatient Raw Score: 20    JH-HLM Achieved: 4: Move to chair/commode  JH-HLM Goal: 6: Walk 10 steps or more    HLM Goal listed above. Continue with multidisciplinary rounding and encourage appropriate mobility to improve upon HLM goals.     Today, Patient Was Seen By: Felipe Cervantes DO    ** Please Note: This note was completed in part utilizing [employer] Dragon One [employer] software dictation.  Grammatical errors, random word insertions, spelling mistakes, and incomplete sentences may be an occasional consequence of this system secondary to software limitations, ambient noise, and hardware issues.  If you have any questions or concerns about the content, text, or information contained within the body of this dictation, please contact the provider for clarification. **

## 2024-01-04 NOTE — PROGRESS NOTES
Count includes the [facility]  Progress Note  Name: Amanda Low I  MRN: [id number]  Unit/Bed#: [place] [address] -01 I Date of Admission: 1/1/2024   Date of Service: 1/3/2024 I Hospital Day: 2    Assessment/Plan   * Septic shock (HCC)  Assessment & Plan  Presented with sepsis of a urinary etiology requiring ICU admission  Blood pressure improved  Found to have E. coli bacteremia, remains on ceftriaxone 2 g every 24 hours  Cultures currently pending susceptibilities  Urine culture with E. coli greater than 100,000 units    TERESSA (acute kidney injury) ([facility])  Assessment & Plan  Secondary to acute infection  Initially required ICU level care  Improving, renal function back to baseline      Right ureteral stone  Assessment & Plan  Patient underwent cystoscopy with stent placement for infected right ureteral stone  Found to have 5 mm right proximal ureteral calculus  Patient underwent cystoscopy with right ureteral stent insertion on January 1, 2024  Due to concern for sepsis as well as hypotension required ICU level care  Ureteral stent was placed, patient will need second stage procedure for stone retrieval in 3 to 6 months                   Hospital Course:     23-year-old female patient presenting with right flank pain, found to have sepsis of urinary etiology secondary to infected right nephrolithiasis.  Underwent operative management on January 1, 2024 with stent placement.  She will need second stage procedure for definitive treatment.    Assessment:      Principal Problem:    Septic shock (HCC)  Active Problems:    Right ureteral stone    TERESSA (acute kidney injury) ([facility])      Plan:    Continue IV ceftriaxone  Repeat labs in a.m.  Await culture and sensitivity       VTE Pharmacologic Prophylaxis:   Pharmacologic: Enoxaparin (Lovenox)  Mechanical VTE Prophylaxis in Place: Yes    AM-PAC Basic Mobility:  Basic Mobility Inpatient Raw Score: 20    -[facility] Achieved: 4: Move to chair/commode  -[facility] Goal: 6: Walk 10  steps or more    HLM Goal listed above. Continue with multidisciplinary rounding and encourage appropriate mobility to improve upon HLM goals.         Patient Centered Rounds: Case discussed and reviewed with nursing    Discussions with Specialists or Other Care Team Provider: Case management    Education and Discussions with Family / Patient: Patient family at bedside    Time Spent for Care: 80 minutes.  More than 50% of total time spent on counseling and coordination of care as described above.    Current Length of Stay: 2 day(s)    Current Patient Status: Inpatient   Certification Statement: The patient will continue to require additional inpatient hospital stay due to bacteremia    Discharge Plan / Estimated Discharge Date: 24 to 48 hours pending culture results    Code Status: Level 1 - Full Code      Subjective:   Seen and examined, no acute complaints.  Did have headache today was given Reglan as well as Fioricet.    A complete and comprehensive 14 point organ system review has been performed and all other systems are negative other than stated above.    Objective:     Vitals:   Temp (24hrs), Av.3 °F (37.4 °C), Min:98.8 °F (37.1 °C), Max:100.1 °F (37.8 °C)    Temp:  [98.8 °F (37.1 °C)-100.1 °F (37.8 °C)] 98.8 °F (37.1 °C)  HR:  [104-111] 109  Resp:  [16] 16  BP: (102-132)/(69-94) 132/94  SpO2:  [95 %-97 %] 96 %  Body mass index is 30.14 kg/m².     Input and Output Summary (last 24 hours):       Intake/Output Summary (Last 24 hours) at 1/3/2024 2046  Last data filed at 1/3/2024 1642  Gross per 24 hour   Intake --   Output 2150 ml   Net -2150 ml       Physical Exam:     General: well appearing, no acute distress  HEENT: atraumatic, PERRLA, moist mucosa, normal pharynx, normal tonsils and adenoids, normal tongue, no fluid in sinuses  Neck: Trachea midline, no carotid bruit, no masses  Respiratory: normal chest wall expansion, CTA B, no r/r/w, no rubs  Cardiovascular: RRR, no m/r/g, Normal S1 and S2  Abdomen:  Soft, non-tender, non-distended, normal bowel sounds in all quadrants, no hepatosplenomegaly, no tympany  Rectal: deferred  Musculoskeletal: normal ROM in upper and lower extremities  Integumentary: warm, dry, and pink, with no rash, purpura, or petechia  Heme/Lymph: no lymphadenopathy, no bruises  Neurological: Cranial Nerves II-XII grossly intact  Psychiatric: cooperative with normal mood, affect, and cognition      Additional Data:     Labs:    Results from last 7 days   Lab Units 01/03/24  0955 01/02/24  0436 01/02/24  0023 01/01/24  1616   WBC Thousand/uL 12.71* 16.12*  --  10.68*   HEMOGLOBIN g/dL 10.1* 10.2*  --  13.7   HEMATOCRIT % 30.7* 31.2*  --  40.9   PLATELETS Thousands/uL 75* 78*   < > 143*   NEUTROS PCT %  --   --   --  95*   LYMPHS PCT %  --   --   --  4*   LYMPHO PCT %  --  1*  --   --    MONOS PCT %  --   --   --  1*   MONO PCT %  --  6  --   --    EOS PCT %  --  0  --  0    < > = values in this interval not displayed.     Results from last 7 days   Lab Units 01/03/24  0955 01/02/24  0436   POTASSIUM mmol/L 4.2 3.2*   CHLORIDE mmol/L 110* 114*   CO2 mmol/L 20* 17*   BUN mg/dL 19 17   CREATININE mg/dL 1.00 1.41*   CALCIUM mg/dL 7.9* 6.6*   ALK PHOS U/L  --  21*   ALT U/L  --  11   AST U/L  --  13           * I Have Reviewed All Lab Data Listed Above.  * Additional Pertinent Lab Tests Reviewed: All Labs For Current Hospital Admission Reviewed    Imaging:    Imaging Reports Reviewed Today Include: Reviewed operative report from the urology service  Imaging Personally Reviewed by Myself Includes: Reviewed    Recent Cultures (last 7 days):     Results from last 7 days   Lab Units 01/01/24 2238 01/01/24  1725 01/01/24  1621 01/01/24  1616   BLOOD CULTURE   --   --  Escherichia coli* Escherichia coli*   GRAM STAIN RESULT   --   --  Gram negative rods* Gram negative rods*   URINE CULTURE  50,000-59,000 cfu/ml Escherichia coli* >100,000 cfu/ml Escherichia coli*  --   --        Last 24 Hours Medication  List:   Current Facility-Administered Medications   Medication Dose Route Frequency Provider Last Rate    acetaminophen  650 mg Oral Q6H PRN Edil Greenberg MD      cefTRIAXone  2,000 mg Intravenous Q24H Kee Sethi MD 2,000 mg (01/03/24 1024)    [START ON 1/4/2024] enoxaparin  40 mg Subcutaneous Q24H [facility] Felipe Cervantes DO      metoclopramide  10 mg Intravenous Q6H PRN Felipe Cervantes DO      morphine injection  2 mg Intravenous Q4H PRN Ida Ervin PA-C      ondansetron  4 mg Oral Q6H PRN Edil Greenberg MD      oxyCODONE  10 mg Oral Q4H PRN Edil Greenberg MD      oxyCODONE  5 mg Oral Q4H PRN Edil Greenberg MD         AM-PAC Basic Mobility:  Basic Mobility Inpatient Raw Score: 20    JH-HLM Achieved: 4: Move to chair/commode  JH-HLM Goal: 6: Walk 10 steps or more    HLM Goal listed above. Continue with multidisciplinary rounding and encourage appropriate mobility to improve upon HLM goals.     Today, Patient Was Seen By: Felipe Cervantes DO    ** Please Note: This note was completed in part utilizing [employer] Dragon One Medical software dictation.  Grammatical errors, random word insertions, spelling mistakes, and incomplete sentences may be an occasional consequence of this system secondary to software limitations, ambient noise, and hardware issues.  If you have any questions or concerns about the content, text, or information contained within the body of this dictation, please contact the provider for clarification. **

## 2024-01-04 NOTE — PLAN OF CARE
Problem: PAIN - ADULT  Goal: Verbalizes/displays adequate comfort level or baseline comfort level  Description: Interventions:  - Encourage patient to monitor pain and request assistance  - Assess pain using appropriate pain scale  - Administer analgesics based on type and severity of pain and evaluate response  - Implement non-pharmacological measures as appropriate and evaluate response  - Consider cultural and social influences on pain and pain management  - Notify physician/advanced practitioner if interventions unsuccessful or patient reports new pain  Outcome: Progressing     Problem: INFECTION - ADULT  Goal: Absence or prevention of progression during hospitalization  Description: INTERVENTIONS:  - Assess and monitor for signs and symptoms of infection  - Monitor lab/diagnostic results  - Monitor all insertion sites, i.e. indwelling lines, tubes, and drains  - Monitor endotracheal if appropriate and nasal secretions for changes in amount and color  - [place] appropriate cooling/warming therapies per order  - Administer medications as ordered  - Instruct and encourage patient and family to use good hand hygiene technique  - Identify and instruct in appropriate isolation precautions for identified infection/condition  Outcome: Progressing  Goal: Absence of fever/infection during neutropenic period  Description: INTERVENTIONS:  - Monitor WBC     Outcome: Progressing     Problem: SAFETY ADULT  Goal: Patient will remain free of falls  Description: INTERVENTIONS:  - Educate patient/family on patient safety including physical limitations  - Instruct patient to call for assistance with activity   - Consult OT/PT to assist with strengthening/mobility   - Keep Call bell within reach  - Keep bed low and locked with side rails adjusted as appropriate  - Keep care items and personal belongings within reach  - Initiate and maintain comfort rounds  - Make Fall Risk Sign visible to staff  - Offer Toileting every 2  Hours, in advance of need  - Initiate/Maintain bed alarm  - Obtain necessary fall risk management equipment: bed alarm  - Apply yellow socks and bracelet for high fall risk patients  - Consider moving patient to room near nurses station  Outcome: Progressing  Goal: Maintain or return to baseline ADL function  Description: INTERVENTIONS:  -  Assess patient's ability to carry out ADLs; assess patient's baseline for ADL function and identify physical deficits which impact ability to perform ADLs (bathing, care of mouth/teeth, toileting, grooming, dressing, etc.)  - Assess/evaluate cause of self-care deficits   - Assess range of motion  - Assess patient's mobility; develop plan if impaired  - Assess patient's need for assistive devices and provide as appropriate  - Encourage maximum independence but intervene and supervise when necessary  - Involve family in performance of ADLs  - Assess for home care needs following discharge   - Consider OT consult to assist with ADL evaluation and planning for discharge  - Provide patient education as appropriate  Outcome: Progressing  Goal: Maintains/Returns to pre admission functional level  Description: INTERVENTIONS:  - Perform AM-PAC 6 Click Basic Mobility/ Daily Activity assessment daily.  - Set and communicate daily mobility goal to care team and patient/family/caregiver.   - Collaborate with rehabilitation services on mobility goals if consulted  - Perform Range of Motion 3 times a day.  - Reposition patient every 2 hours.  - Dangle patient 3 times a day  - Stand patient 3 times a day  - Ambulate patient 3 times a day  - Out of bed to chair 3 times a day   - Out of bed for meals 3 times a day  - Out of bed for toileting  - Record patient progress and toleration of activity level   Outcome: Progressing     Problem: DISCHARGE PLANNING  Goal: Discharge to home or other facility with appropriate resources  Description: INTERVENTIONS:  - Identify barriers to discharge w/patient  and caregiver  - Arrange for needed discharge resources and transportation as appropriate  - Identify discharge learning needs (meds, wound care, etc.)  - Arrange for interpretive services to assist at discharge as needed  - Refer to Case Management Department for coordinating discharge planning if the patient needs post-hospital services based on physician/advanced practitioner order or complex needs related to functional status, cognitive ability, or social support system  Outcome: Progressing     Problem: Knowledge Deficit  Goal: Patient/family/caregiver demonstrates understanding of disease process, treatment plan, medications, and discharge instructions  Description: Complete learning assessment and assess knowledge base.  Interventions:  - Provide teaching at level of understanding  - Provide teaching via preferred learning methods  Outcome: Progressing     Problem: Nutrition/Hydration-ADULT  Goal: Nutrient/Hydration intake appropriate for improving, restoring or maintaining nutritional needs  Description: Monitor and assess patient's nutrition/hydration status for malnutrition. Collaborate with interdisciplinary team and initiate plan and interventions as ordered.  Monitor patient's weight and dietary intake as ordered or per policy. Utilize nutrition screening tool and intervene as necessary. Determine patient's food preferences and provide high-protein, high-caloric foods as appropriate.     INTERVENTIONS:  - Monitor oral intake, urinary output, labs, and treatment plans  - Assess nutrition and hydration status and recommend course of action  - Evaluate amount of meals eaten  - Assist patient with eating if necessary   - Allow adequate time for meals  - Recommend/ encourage appropriate diets, oral nutritional supplements, and vitamin/mineral supplements  - Order, calculate, and assess calorie counts as needed  - Recommend, monitor, and adjust tube feedings and TPN/PPN based on assessed needs  - Assess need  for intravenous fluids  - Provide specific nutrition/hydration education as appropriate  - Include patient/family/caregiver in decisions related to nutrition  Outcome: Progressing

## 2024-01-04 NOTE — PLAN OF CARE
Problem: PAIN - ADULT  Goal: Verbalizes/displays adequate comfort level or baseline comfort level  Description: Interventions:  - Encourage patient to monitor pain and request assistance  - Assess pain using appropriate pain scale  - Administer analgesics based on type and severity of pain and evaluate response  - Implement non-pharmacological measures as appropriate and evaluate response  - Consider cultural and social influences on pain and pain management  - Notify physician/advanced practitioner if interventions unsuccessful or patient reports new pain  Outcome: Progressing     Problem: INFECTION - ADULT  Goal: Absence or prevention of progression during hospitalization  Description: INTERVENTIONS:  - Assess and monitor for signs and symptoms of infection  - Monitor lab/diagnostic results  - Monitor all insertion sites, i.e. indwelling lines, tubes, and drains  - Monitor endotracheal if appropriate and nasal secretions for changes in amount and color  - [place] appropriate cooling/warming therapies per order  - Administer medications as ordered  - Instruct and encourage patient and family to use good hand hygiene technique  - Identify and instruct in appropriate isolation precautions for identified infection/condition  Outcome: Progressing  Goal: Absence of fever/infection during neutropenic period  Description: INTERVENTIONS:  - Monitor WBC    Outcome: Progressing     Problem: SAFETY ADULT  Goal: Patient will remain free of falls  Description: INTERVENTIONS:  - Educate patient/family on patient safety including physical limitations  - Instruct patient to call for assistance with activity   - Consult OT/PT to assist with strengthening/mobility   - Keep Call bell within reach  - Keep bed low and locked with side rails adjusted as appropriate  - Keep care items and personal belongings within reach  - Initiate and maintain comfort rounds  - Make Fall Risk Sign visible to staff  - Offer Toileting every 2 Hours,  in advance of need  - Initiate/Maintain bed alarm  - Obtain necessary fall risk management equipment: bed alarm  - Apply yellow socks and bracelet for high fall risk patients  - Consider moving patient to room near nurses station  Outcome: Progressing  Goal: Maintain or return to baseline ADL function  Description: INTERVENTIONS:  -  Assess patient's ability to carry out ADLs; assess patient's baseline for ADL function and identify physical deficits which impact ability to perform ADLs (bathing, care of mouth/teeth, toileting, grooming, dressing, etc.)  - Assess/evaluate cause of self-care deficits   - Assess range of motion  - Assess patient's mobility; develop plan if impaired  - Assess patient's need for assistive devices and provide as appropriate  - Encourage maximum independence but intervene and supervise when necessary  - Involve family in performance of ADLs  - Assess for home care needs following discharge   - Consider OT consult to assist with ADL evaluation and planning for discharge  - Provide patient education as appropriate  Outcome: Progressing  Goal: Maintains/Returns to pre admission functional level  Description: INTERVENTIONS:  - Perform AM-PAC 6 Click Basic Mobility/ Daily Activity assessment daily.  - Set and communicate daily mobility goal to care team and patient/family/caregiver.   - Collaborate with rehabilitation services on mobility goals if consulted  - Perform Range of Motion 3 times a day.  - Reposition patient every 2 hours.  - Dangle patient 3 times a day  - Stand patient 3 times a day  - Ambulate patient 3 times a day  - Out of bed to chair 3 times a day   - Out of bed for meals 3 times a day  - Out of bed for toileting  - Record patient progress and toleration of activity level   Outcome: Progressing     Problem: DISCHARGE PLANNING  Goal: Discharge to home or other facility with appropriate resources  Description: INTERVENTIONS:  - Identify barriers to discharge w/patient and  caregiver  - Arrange for needed discharge resources and transportation as appropriate  - Identify discharge learning needs (meds, wound care, etc.)  - Arrange for interpretive services to assist at discharge as needed  - Refer to Case Management Department for coordinating discharge planning if the patient needs post-hospital services based on physician/advanced practitioner order or complex needs related to functional status, cognitive ability, or social support system  Outcome: Progressing     Problem: Knowledge Deficit  Goal: Patient/family/caregiver demonstrates understanding of disease process, treatment plan, medications, and discharge instructions  Description: Complete learning assessment and assess knowledge base.  Interventions:  - Provide teaching at level of understanding  - Provide teaching via preferred learning methods  Outcome: Progressing     Problem: Nutrition/Hydration-ADULT  Goal: Nutrient/Hydration intake appropriate for improving, restoring or maintaining nutritional needs  Description: Monitor and assess patient's nutrition/hydration status for malnutrition. Collaborate with interdisciplinary team and initiate plan and interventions as ordered.  Monitor patient's weight and dietary intake as ordered or per policy. Utilize nutrition screening tool and intervene as necessary. Determine patient's food preferences and provide high-protein, high-caloric foods as appropriate.     INTERVENTIONS:  - Monitor oral intake, urinary output, labs, and treatment plans  - Assess nutrition and hydration status and recommend course of action  - Evaluate amount of meals eaten  - Assist patient with eating if necessary   - Allow adequate time for meals  - Recommend/ encourage appropriate diets, oral nutritional supplements, and vitamin/mineral supplements  - Order, calculate, and assess calorie counts as needed  - Recommend, monitor, and adjust tube feedings and TPN/PPN based on assessed needs  - Assess need for  intravenous fluids  - Provide specific nutrition/hydration education as appropriate  - Include patient/family/caregiver in decisions related to nutrition  Outcome: Progressing

## 2024-01-04 NOTE — RESULT ENCOUNTER NOTE
Discharged home from [facility] on cefpodoxime which provides appropriate coverage of urinary tract infection.

## 2024-01-04 NOTE — ASSESSMENT & PLAN NOTE
Patient underwent cystoscopy with stent placement for infected right ureteral stone  Found to have 5 mm right proximal ureteral calculus  Patient underwent cystoscopy with right ureteral stent insertion on January 1, 2024  Due to concern for sepsis as well as hypotension required ICU level care  Ureteral stent was placed, patient will need second stage procedure for stone retrieval in 3 to 6 months

## 2024-01-04 NOTE — ASSESSMENT & PLAN NOTE
Lab Results   Component Value Date    PLT 86 (L) 01/04/2024    PLT 75 (L) 01/03/2024    PLT 78 (L) 01/02/2024   Felt to be secondary to infection, platelet count increasing  Repeat CBC in 1 week

## 2024-01-04 NOTE — TELEPHONE ENCOUNTER
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 Next available, any doc please.   Patient stented by FUNMILAYO on 1/1    Patient INPT as of 1/4, will monitor for discharge.

## 2024-01-04 NOTE — ASSESSMENT & PLAN NOTE
Presented with sepsis of a urinary etiology requiring ICU admission  Blood pressure improved  Found to have E. coli bacteremia, remains on ceftriaxone 2 g every 24 hours  Cultures currently pending susceptibilities  Urine culture with E. coli greater than 100,000 units

## 2024-01-04 NOTE — DISCHARGE SUMMARY
[facility]  Discharge- Amanda Low 2000, 23 y.o. female MRN: [id number]  Unit/Bed#: [address] 228-01 Encounter: [id number]  Primary Care Provider: Mitzi Cleveland DO   Date and time admitted to hospital: 1/1/2024  9:28 PM      Admitting Provider:  Moody Pagan MD  Discharge Provider:  Felipe Cervantes DO  Admission Date: 1/1/2024       Discharge Date: 01/04/24   LOS: 3  Primary Care Physician at Discharge: Mitzi Cleveland [phone]    HOSPITAL COURSE:  Amanda Low is a 23 y.o. female who presented as a transfer for right ureteral calculus with mild to moderate hydronephrosis.  The patient was found to have acute kidney injury present on admission, moderate hydronephrosis and concern for calyceal rupture.  The patient received 2.3 L of IV fluid bolus, she was started on vasopressor therapy and initiated on ceftriaxone.  Blood cultures demonstrated gram-negative rods consistent with E. coli.    Patient underwent operative management with stent placement.  She was subsequently weaned off of pressors and transferred to the general medical floor where she was observed for 48 hours without need for vasopressor therapy.  Blood cultures demonstrated pansensitive E. coli and she was transition to oral oral antibiotics to complete a course.    At the time of discharge the patient was tolerating oral diet they were without acute complaint and they were medically cleared for discharge.  All questions were answered the patient's satisfaction and they were in agreement with the discharge plan.    DISCHARGE DIAGNOSES  * Septic shock ([facility])  Assessment & Plan  Presented with sepsis of a urinary etiology requiring ICU admission  Blood pressure improved  Found to have E. coli bacteremia, remains on ceftriaxone 2 g every 24 hours  Transition to cefpodoxime  Urine culture with E. coli greater than 100,000 units    Thrombocytopenia ([facility])  Assessment & Plan  Lab Results    Component Value Date    PLT 86 (L) 01/04/2024    PLT 75 (L) 01/03/2024    PLT 78 (L) 01/02/2024   Felt to be secondary to infection, platelet count increasing  Repeat CBC in 1 week      TERESSA (acute kidney injury) (HCC)  Assessment & Plan  Secondary to acute infection  Initially required ICU level care  Improving, renal function back to baseline      Right ureteral stone  Assessment & Plan  Patient underwent cystoscopy with stent placement for infected right ureteral stone  Found to have 5 mm right proximal ureteral calculus  Patient underwent cystoscopy with right ureteral stent insertion on January 1, 2024  Due to concern for sepsis as well as hypotension required ICU level care  Ureteral stent was placed, patient will need second stage procedure for stone retrieval in 3 to 6 months        CONSULTING PROVIDERS   IP CONSULT TO CASE MANAGEMENT    PROCEDURES PERFORMED  Procedure(s) (LRB):  CYSTOSCOPY RETROGRADE PYELOGRAM WITH INSERTION STENT URETERAL (Right)    RADIOLOGY RESULTS  FL retrograde pyelogram    Result Date: 1/2/2024  Impression: Fluoroscopic guidance provided for right retrograde pyelogram. Please see procedure report for further details. Workstation performed: [id number]     CT abdomen pelvis wo contrast    Result Date: 1/1/2024  Impression: Reidentified 5 mm proximal right ureteral calculus causing mild to moderate hydronephrosis with delayed nephrogram compatible with obstructive uropathy. A moderate amount of perinephric fluid is similar to the prior exam and again suspicious for calyceal  rupture. Workstation performed: [id number]     CT abdomen pelvis with contrast    Result Date: 1/1/2024  Impression: Obstructing 5 mm ureteral calculus at the right ureteropelvic junction with resultant moderate right hydronephrosis, right pyelitis, delayed right nephrogram, and moderate right perinephric fluid which raises concern for right calyceal rupture. Recommend  repeat CT abdomen pelvis without contrast  now to evaluate for for the possibility of a calyceal rupture. Mild hepatosplenomegaly. I personally discussed this study with MARGARITO KELLOGG on 1/1/2024 7:11 PM. Workstation performed: [id number]       LABS  Results from last 7 days   Lab Units 01/04/24 0455 01/03/24 0955 01/02/24  0436 01/02/24  0023 01/01/24  1616   WBC Thousand/uL 12.55* 12.71* 16.12*  --  10.68*   HEMOGLOBIN g/dL 10.3* 10.1* 10.2*  --  13.7   HEMATOCRIT % 31.1* 30.7* 31.2*  --  40.9   MCV fL 86 87 87  --  85   TOTAL NEUT ABS Thousand/uL  --   --  14.99*  --   --    BANDS PCT %  --   --  13*  --   --    PLATELETS Thousands/uL 86* 75* 78* 65* 143*     Results from last 7 days   Lab Units 01/04/24 0455 01/03/24 0955 01/02/24  0436 01/01/24  1616   SODIUM mmol/L 136 134* 138 139   POTASSIUM mmol/L 3.8 4.2 3.2* 4.1   CHLORIDE mmol/L 110* 110* 114* 105   CO2 mmol/L 20* 20* 17* 21   BUN mg/dL 14 19 17 17   CREATININE mg/dL 0.78 1.00 1.41* 1.47*   CALCIUM mg/dL 8.1* 7.9* 6.6* 9.4   ALBUMIN g/dL  --   --  2.9* 4.7   TOTAL BILIRUBIN mg/dL  --   --  0.65 1.23*   ALK PHOS U/L  --   --  21* 43   ALT U/L  --   --  11 14   AST U/L  --   --  13 16   EGFR ml/min/1.73sq m 109 80 53 50   GLUCOSE RANDOM mg/dL 91 92 134 140                              Results from last 7 days   Lab Units 01/01/24  1938 01/01/24  1616   LACTIC ACID mmol/L 1.6 3.2*           Cultures:   Results from last 7 days   Lab Units 01/01/24  1725   COLOR UA  Light Yellow   CLARITY UA  Clear   SPEC GRAV UA  1.049*   PH UA  6.0   LEUKOCYTES UA  Small*   NITRITE UA  Positive*   GLUCOSE UA mg/dl Negative   KETONES UA mg/dl Trace*   BILIRUBIN UA  Negative   BLOOD UA  Small*      Results from last 7 days   Lab Units 01/01/24  1725   RBC UA /hpf 20-30*   WBC UA /hpf Innumerable*   EPITHELIAL CELLS WET PREP /hpf Occasional   BACTERIA UA /hpf Occasional      Results from last 7 days   Lab Units 01/01/24  2238 01/01/24  1725 01/01/24  1622 01/01/24  1621 01/01/24  1616   BLOOD CULTURE   --    "--   --  Escherichia coli*  Escherichia coli* Escherichia coli*  Escherichia coli*   GRAM STAIN RESULT   --   --   --  Gram negative rods* Gram negative rods*   URINE CULTURE  50,000-59,000 cfu/ml Escherichia coli* >100,000 cfu/ml Escherichia coli*  --   --   --    INFLUENZA A PCR   --   --  Negative  --   --      Results from last 7 days   Lab Units 01/01/24  1622   SARS-COV-2  Negative   INFLUENZA A PCR  Negative   INFLUENZA B PCR  Negative   RSV PCR  Negative         PHYSICAL EXAM:  Vitals:   Blood Pressure: 134/95 (01/04/24 0757)  Pulse: 91 (01/04/24 0757)  Temperature: 99.3 °F (37.4 °C) (01/04/24 0757)  Temp Source: Oral (01/04/24 0757)  Respirations: 16 (01/04/24 0757)  Height: 5' 7\" (170.2 cm) (01/01/24 2134)  Weight - Scale: 86.2 kg (190 lb 0.6 oz) (01/04/24 0600)  SpO2: 95 % (01/04/24 0757)      General: well appearing, no acute distress  HEENT: atraumatic, PERRLA, moist mucosa, normal pharynx, normal tonsils and adenoids, normal tongue, no fluid in sinuses  Neck: Trachea midline, no carotid bruit, no masses  Respiratory: normal chest wall expansion, CTA B  Cardiovascular: RRR, no m/r/g, Normal S1 and S2  Abdomen: Soft, non-tender, non-distended, normal bowel sounds in all quadrants, no hepatosplenomegaly, no tympany  Rectal: deferred  Musculoskeletal: Moves all  Integumentary: warm, dry, and pink, with no visible rash, purpura, or petechia  Heme/Lymph: no lymphadenopathy, no bruises  Neurological: Cranial Nerves II-XII grossly intact  Psychiatric: cooperative with normal mood, affect, and cognition       Discharge Disposition: [facility]    AM-PAC Basic Mobility:  Basic Mobility Inpatient Raw Score: 20    JH-HLM Achieved: 6: Walk 10 steps or more  JH-HLM Goal: 6: Walk 10 steps or more    HLM Goal listed above. Continue with ongoing physical therapy and encourage appropriate mobility to improve upon HLM goals.      Test Results Pending at Discharge:           Medications   Summary of " Medication Adjustments made as a result of this hospitalization: See discharge summary and AVS for medication changes  Medication Dosing Tapers - Please refer to Discharge Medication List for details on any medication dosing tapers (if applicable to patient).  Discharge Medication List: See after visit summary for reconciled discharge medications.     Diet restrictions:         Diet Orders   (From admission, onward)                 Start     Ordered    01/02/24 0053  Diet Regular; Regular House  Diet effective now        References:    Adult Nutrition Support Algorithm    RD Therapeutic Diet Order Protocol   Question Answer Comment   Diet Type Regular    Regular Regular House    RD to adjust diet per protocol? Yes        01/02/24 0052                  Activity restrictions: No strenuous activity  Discharge Condition: good    Outpatient Follow-Up and Discharge Instructions  See after visit summary section titled Discharge Instructions for information provided to patient and family.      Code Status: Level 1 - Full Code  Discharge Statement   I spent 86 minutes discharging the patient. This time was spent on the day of discharge. Greater than 50% of total time was spent with the patient and / or family counseling and / or coordination of care.    ** Please Note: This note was completed in part utilizing Nuance Dragon Medical One Software.  Grammatical errors, random word insertions, spelling mistakes, and incomplete sentences may be an occasional consequence of this system secondary to software limitations, ambient noise, and hardware issues.  If you have any questions or concerns about the content, text, or information contained within the body of this dictation, please contact the provider for clarification.**

## 2024-01-04 NOTE — ASSESSMENT & PLAN NOTE
Secondary to acute infection  Initially required ICU level care  Improving, renal function back to baseline

## 2024-01-05 NOTE — TELEPHONE ENCOUNTER
Pt mother missed call and returned the phone call. Message was relayed that surgery scheduler will be reaching out within a week or two.    No further action needed at this time.

## 2024-01-05 NOTE — UTILIZATION REVIEW
NOTIFICATION OF ADMISSION DISCHARGE   This is a Notification of Discharge from [facility]. Please be advised that this patient has been discharge from our facility. Below you will find the admission and discharge date and time including the patient’s disposition.   UTILIZATION REVIEW CONTACT:  Elli Terry MA  Utilization   Network Utilization Review Department  Phone: [phone] x carefully listen to the prompts. All voicemails are confidential.  Email: [email or web address]     ADMISSION INFORMATION  PRESENTATION DATE: 1/1/2024  9:28 PM  OBERVATION ADMISSION DATE:   INPATIENT ADMISSION DATE: 1/1/24  9:28 PM   DISCHARGE DATE: 1/4/2024  3:27 PM   DISPOSITION:Home/Self Care    Network Utilization Review Department  ATTENTION: Please call with any questions or concerns to [phone] and carefully listen to the prompts so that you are directed to the right person. All voicemails are confidential.   For Discharge needs, contact Care Management DC Support Team at [phone] opt. [phone]  Send all requests for admission clinical reviews, approved or denied determinations and any other requests to dedicated fax number below belonging to the campus where the patient is receiving treatment. List of dedicated fax numbers for the Facilities:  FACILITY NAME UR FAX NUMBER   ADMISSION DENIALS (Administrative/Medical Necessity) [phone]   DISCHARGE SUPPORT TEAM ([facility]) [phone]   PARENT CHILD HEALTH (Maternity/NICU/Pediatrics) [phone]   [facility] [phone]   [facility] [phone]   [facility] [phone]   [facility] [phone]   [facility] [phone]   [facility] [phone]   General acute hospital [phone]   [facility] [phone]    [facility] [phone]   [facility] [phone]   [facility] [phone]

## 2024-01-05 NOTE — TELEPHONE ENCOUNTER
Called and left a vm on pt's number stating the surgery scheduler will be reaching out in a week to two weeks

## 2024-01-05 NOTE — TELEPHONE ENCOUNTER
Patient's mom called to f/u on what's next for her daughter's kidney Stone treatment. She was released yesterday.    CB: [phone]

## 2024-01-11 ENCOUNTER — PREP FOR PROCEDURE (OUTPATIENT)
Dept: UROLOGY | Facility: MEDICAL CENTER | Age: 24
End: 2024-01-11

## 2024-01-11 ENCOUNTER — APPOINTMENT (OUTPATIENT)
Dept: LAB | Facility: CLINIC | Age: 24
End: 2024-01-11
Payer: COMMERCIAL

## 2024-01-11 DIAGNOSIS — D69.6 THROMBOCYTOPENIA (HCC): ICD-10-CM

## 2024-01-11 DIAGNOSIS — N20.0 CALCULUS OF KIDNEY: ICD-10-CM

## 2024-01-11 DIAGNOSIS — Z01.812 PRE-OPERATIVE LABORATORY EXAMINATION: Primary | ICD-10-CM

## 2024-01-11 DIAGNOSIS — R39.89 SUSPECTED UTI: ICD-10-CM

## 2024-01-11 LAB
BASOPHILS # BLD AUTO: 0.08 THOUSANDS/ÂΜL (ref 0–0.1)
BASOPHILS NFR BLD AUTO: 1 % (ref 0–1)
EOSINOPHIL # BLD AUTO: 0.15 THOUSAND/ÂΜL (ref 0–0.61)
EOSINOPHIL NFR BLD AUTO: 2 % (ref 0–6)
ERYTHROCYTE [DISTWIDTH] IN BLOOD BY AUTOMATED COUNT: 12.8 % (ref 11.6–15.1)
HCT VFR BLD AUTO: 38.7 % (ref 34.8–46.1)
HGB BLD-MCNC: 12.3 G/DL (ref 11.5–15.4)
IMM GRANULOCYTES # BLD AUTO: 0.14 THOUSAND/UL (ref 0–0.2)
IMM GRANULOCYTES NFR BLD AUTO: 2 % (ref 0–2)
LYMPHOCYTES # BLD AUTO: 2.51 THOUSANDS/ÂΜL (ref 0.6–4.47)
LYMPHOCYTES NFR BLD AUTO: 28 % (ref 14–44)
MCH RBC QN AUTO: 27.3 PG (ref 26.8–34.3)
MCHC RBC AUTO-ENTMCNC: 31.8 G/DL (ref 31.4–37.4)
MCV RBC AUTO: 86 FL (ref 82–98)
MONOCYTES # BLD AUTO: 0.7 THOUSAND/ÂΜL (ref 0.17–1.22)
MONOCYTES NFR BLD AUTO: 8 % (ref 4–12)
NEUTROPHILS # BLD AUTO: 5.53 THOUSANDS/ÂΜL (ref 1.85–7.62)
NEUTS SEG NFR BLD AUTO: 59 % (ref 43–75)
NRBC BLD AUTO-RTO: 0 /100 WBCS
PLATELET # BLD AUTO: 379 THOUSANDS/UL (ref 149–390)
PMV BLD AUTO: 11.4 FL (ref 8.9–12.7)
RBC # BLD AUTO: 4.51 MILLION/UL (ref 3.81–5.12)
WBC # BLD AUTO: 9.11 THOUSAND/UL (ref 4.31–10.16)

## 2024-01-11 PROCEDURE — 36415 COLL VENOUS BLD VENIPUNCTURE: CPT

## 2024-01-11 PROCEDURE — 85025 COMPLETE CBC W/AUTO DIFF WBC: CPT

## 2024-01-11 NOTE — TELEPHONE ENCOUNTER
Called and spoke with patient's mother. Explained I knew she was hoping for a Friday at [facility] or [facility], but those are booking out until March.     Offered 2/1 @ [place] OR with Dr. Sethi. She confirmed that date.     Went over prep  -NPO  -Needs ride to and from (outpatient)  -Hospital calls afternoon prior with arrival time  -Needs UCX 2 weeks prior (order placed)    Confirmed address and mailed surgery packet 1/11.

## 2024-01-18 ENCOUNTER — APPOINTMENT (OUTPATIENT)
Dept: LAB | Facility: CLINIC | Age: 24
End: 2024-01-18
Payer: COMMERCIAL

## 2024-01-18 DIAGNOSIS — R39.89 SUSPECTED UTI: ICD-10-CM

## 2024-01-18 DIAGNOSIS — N20.0 CALCULUS OF KIDNEY: ICD-10-CM

## 2024-01-18 DIAGNOSIS — Z01.812 PRE-OPERATIVE LABORATORY EXAMINATION: ICD-10-CM

## 2024-01-18 PROCEDURE — 87077 CULTURE AEROBIC IDENTIFY: CPT

## 2024-01-18 PROCEDURE — 87186 SC STD MICRODIL/AGAR DIL: CPT

## 2024-01-18 PROCEDURE — 87086 URINE CULTURE/COLONY COUNT: CPT

## 2024-01-20 LAB
BACTERIA UR CULT: ABNORMAL
BACTERIA UR CULT: ABNORMAL

## 2024-01-22 ENCOUNTER — TELEPHONE (OUTPATIENT)
Dept: UROLOGY | Facility: CLINIC | Age: 24
End: 2024-01-22

## 2024-01-22 ENCOUNTER — NURSE TRIAGE (OUTPATIENT)
Age: 24
End: 2024-01-22

## 2024-01-22 DIAGNOSIS — N20.1 RIGHT URETERAL STONE: Primary | Chronic | ICD-10-CM

## 2024-01-22 RX ORDER — CEFUROXIME AXETIL 250 MG/1
250 TABLET ORAL EVERY 12 HOURS SCHEDULED
Qty: 14 TABLET | Refills: 0 | Status: ON HOLD | OUTPATIENT
Start: 2024-01-25 | End: 2024-02-01

## 2024-01-22 NOTE — TELEPHONE ENCOUNTER
----- Message from Kee Sethi MD sent at 1/22/2024 11:31 AM EST -----  Recommend 7 days ceftin to start 1/25 in advance of 2/1 surgery

## 2024-01-24 NOTE — PRE-PROCEDURE INSTRUCTIONS
Pre-Surgery Instructions:   Medication Instructions    [START ON 1/25/2024] cefuroxime (CEFTIN) 250 mg tablet Instructions provided by MD    Multiple Vitamin (multivitamin) tablet Stop taking 7 days prior to surgery.    Medication instructions for day surgery reviewed. Please use only a sip of water to take your instructed medications. Avoid all over the counter vitamins, supplements and NSAIDS for one week prior to surgery per anesthesia guidelines. Tylenol is ok to take as needed.     You will receive a call one business day prior to surgery with an arrival time and hospital directions. If your surgery is scheduled on a Monday, the hospital will be calling you on the Friday prior to your surgery. If you have not heard from anyone by 8pm, please call the hospital supervisor through the hospital  at [phone]. ([place] [phone]).    Do not eat or drink anything after midnight the night before your surgery, including candy, mints, lifesavers, or chewing gum. Do not drink alcohol 24hrs before your surgery. Try not to smoke at least 24hrs before your surgery.       Follow the pre surgery showering instructions as listed in the “My Surgical Experience Booklet” or otherwise provided by your surgeon's office. Do not use a blade to shave the surgical area 1 week before surgery. It is okay to use a clean electric clippers up to 24 hours before surgery. Do not apply any lotions, creams, including makeup, cologne, deodorant, or perfumes after showering on the day of your surgery. Do not use dry shampoo, hair spray, hair gel, or any type of hair products.     No contact lenses, eye make-up, or artificial eyelashes. Remove nail polish, including gel polish, and any artificial, gel, or acrylic nails if possible. Remove all jewelry including rings and body piercing jewelry.     Wear causal clothing that is easy to take on and off. Consider your type of surgery.    Keep any valuables, jewelry, piercings at home.  Please bring any specially ordered equipment (sling, braces) if indicated.    Arrange for a responsible person to drive you to and from the hospital on the day of your surgery. Visitor Guidelines discussed.     Call the surgeon's office with any new illnesses, exposures, or additional questions prior to surgery.    Please reference your “My Surgical Experience Booklet” for additional information to prepare for your upcoming surgery.

## 2024-01-31 ENCOUNTER — ANESTHESIA EVENT (OUTPATIENT)
Dept: PERIOP | Facility: HOSPITAL | Age: 24
End: 2024-01-31
Payer: COMMERCIAL

## 2024-02-01 ENCOUNTER — HOSPITAL ENCOUNTER (OUTPATIENT)
Facility: HOSPITAL | Age: 24
Setting detail: OUTPATIENT SURGERY
Discharge: HOME | End: 2024-02-01
Attending: UROLOGY | Admitting: UROLOGY
Payer: COMMERCIAL

## 2024-02-01 ENCOUNTER — APPOINTMENT (OUTPATIENT)
Dept: RADIOLOGY | Facility: HOSPITAL | Age: 24
End: 2024-02-01
Payer: COMMERCIAL

## 2024-02-01 ENCOUNTER — TELEPHONE (OUTPATIENT)
Dept: UROLOGY | Facility: CLINIC | Age: 24
End: 2024-02-01

## 2024-02-01 ENCOUNTER — ANESTHESIA (OUTPATIENT)
Dept: PERIOP | Facility: HOSPITAL | Age: 24
End: 2024-02-01
Payer: COMMERCIAL

## 2024-02-01 VITALS
DIASTOLIC BLOOD PRESSURE: 64 MMHG | OXYGEN SATURATION: 100 % | TEMPERATURE: 98.4 F | SYSTOLIC BLOOD PRESSURE: 105 MMHG | RESPIRATION RATE: 16 BRPM | BODY MASS INDEX: 26.16 KG/M2 | WEIGHT: 172.6 LBS | HEART RATE: 60 BPM | HEIGHT: 68 IN

## 2024-02-01 DIAGNOSIS — N20.1 RIGHT URETERAL STONE: ICD-10-CM

## 2024-02-01 LAB
EXT PREGNANCY TEST URINE: NEGATIVE
EXT. CONTROL: NORMAL

## 2024-02-01 PROCEDURE — C1747 URETEROSCOPE DIGITAL FLEX SNGL USE RVS DEFLECTION APTRA: HCPCS | Performed by: UROLOGY

## 2024-02-01 PROCEDURE — C2617 STENT, NON-COR, TEM W/O DEL: HCPCS | Performed by: UROLOGY

## 2024-02-01 PROCEDURE — 74420 UROGRAPHY RTRGR +-KUB: CPT

## 2024-02-01 PROCEDURE — 52352 CYSTOURETERO W/STONE REMOVE: CPT | Performed by: UROLOGY

## 2024-02-01 PROCEDURE — 81025 URINE PREGNANCY TEST: CPT | Performed by: STUDENT IN AN ORGANIZED HEALTH CARE EDUCATION/TRAINING PROGRAM

## 2024-02-01 PROCEDURE — C1769 GUIDE WIRE: HCPCS | Performed by: UROLOGY

## 2024-02-01 PROCEDURE — 82360 CALCULUS ASSAY QUANT: CPT | Performed by: UROLOGY

## 2024-02-01 PROCEDURE — 52332 CYSTOSCOPY AND TREATMENT: CPT | Performed by: UROLOGY

## 2024-02-01 DEVICE — INLAY OPTIMA URETERAL STENT W/O GUIDEWIRE
Type: IMPLANTABLE DEVICE | Site: URETER | Status: FUNCTIONAL
Brand: BARD® INLAY OPTIMA® URETERAL STENT

## 2024-02-01 RX ORDER — CEFAZOLIN SODIUM 2 G/50ML
2000 SOLUTION INTRAVENOUS ONCE
Status: COMPLETED | OUTPATIENT
Start: 2024-02-01 | End: 2024-02-01

## 2024-02-01 RX ORDER — PROPOFOL 10 MG/ML
INJECTION, EMULSION INTRAVENOUS CONTINUOUS PRN
Status: DISCONTINUED | OUTPATIENT
Start: 2024-02-01 | End: 2024-02-01

## 2024-02-01 RX ORDER — ONDANSETRON 2 MG/ML
INJECTION INTRAMUSCULAR; INTRAVENOUS AS NEEDED
Status: DISCONTINUED | OUTPATIENT
Start: 2024-02-01 | End: 2024-02-01

## 2024-02-01 RX ORDER — LIDOCAINE HYDROCHLORIDE 20 MG/ML
JELLY TOPICAL AS NEEDED
Status: DISCONTINUED | OUTPATIENT
Start: 2024-02-01 | End: 2024-02-01 | Stop reason: HOSPADM

## 2024-02-01 RX ORDER — MIDAZOLAM HYDROCHLORIDE 2 MG/2ML
INJECTION, SOLUTION INTRAMUSCULAR; INTRAVENOUS AS NEEDED
Status: DISCONTINUED | OUTPATIENT
Start: 2024-02-01 | End: 2024-02-01

## 2024-02-01 RX ORDER — FENTANYL CITRATE/PF 50 MCG/ML
50 SYRINGE (ML) INJECTION
Status: DISCONTINUED | OUTPATIENT
Start: 2024-02-01 | End: 2024-02-01 | Stop reason: HOSPADM

## 2024-02-01 RX ORDER — TAMSULOSIN HYDROCHLORIDE 0.4 MG/1
0.4 CAPSULE ORAL ONCE
Status: COMPLETED | OUTPATIENT
Start: 2024-02-01 | End: 2024-02-01

## 2024-02-01 RX ORDER — LIDOCAINE HYDROCHLORIDE 10 MG/ML
INJECTION, SOLUTION EPIDURAL; INFILTRATION; INTRACAUDAL; PERINEURAL AS NEEDED
Status: DISCONTINUED | OUTPATIENT
Start: 2024-02-01 | End: 2024-02-01

## 2024-02-01 RX ORDER — MAGNESIUM HYDROXIDE 1200 MG/15ML
LIQUID ORAL AS NEEDED
Status: DISCONTINUED | OUTPATIENT
Start: 2024-02-01 | End: 2024-02-01 | Stop reason: HOSPADM

## 2024-02-01 RX ORDER — OXYBUTYNIN CHLORIDE 5 MG/1
5 TABLET, EXTENDED RELEASE ORAL ONCE
Qty: 1 TABLET | Refills: 0 | Status: COMPLETED | OUTPATIENT
Start: 2024-02-01 | End: 2024-02-01

## 2024-02-01 RX ORDER — OXYBUTYNIN CHLORIDE 5 MG/1
5 TABLET, EXTENDED RELEASE ORAL
Qty: 5 TABLET | Refills: 0 | Status: SHIPPED | OUTPATIENT
Start: 2024-02-01 | End: 2024-02-06

## 2024-02-01 RX ORDER — SODIUM CHLORIDE, SODIUM LACTATE, POTASSIUM CHLORIDE, CALCIUM CHLORIDE 600; 310; 30; 20 MG/100ML; MG/100ML; MG/100ML; MG/100ML
INJECTION, SOLUTION INTRAVENOUS CONTINUOUS PRN
Status: DISCONTINUED | OUTPATIENT
Start: 2024-02-01 | End: 2024-02-01

## 2024-02-01 RX ORDER — GLYCOPYRROLATE 0.2 MG/ML
INJECTION INTRAMUSCULAR; INTRAVENOUS AS NEEDED
Status: DISCONTINUED | OUTPATIENT
Start: 2024-02-01 | End: 2024-02-01

## 2024-02-01 RX ORDER — FENTANYL CITRATE 50 UG/ML
INJECTION, SOLUTION INTRAMUSCULAR; INTRAVENOUS AS NEEDED
Status: DISCONTINUED | OUTPATIENT
Start: 2024-02-01 | End: 2024-02-01

## 2024-02-01 RX ORDER — TAMSULOSIN HYDROCHLORIDE 0.4 MG/1
0.4 CAPSULE ORAL
Qty: 5 CAPSULE | Refills: 0 | Status: SHIPPED | OUTPATIENT
Start: 2024-02-01 | End: 2024-02-06

## 2024-02-01 RX ORDER — DEXAMETHASONE SODIUM PHOSPHATE 10 MG/ML
INJECTION, SOLUTION INTRAMUSCULAR; INTRAVENOUS AS NEEDED
Status: DISCONTINUED | OUTPATIENT
Start: 2024-02-01 | End: 2024-02-01

## 2024-02-01 RX ORDER — OXYBUTYNIN CHLORIDE 5 MG/1
5 TABLET, EXTENDED RELEASE ORAL
Qty: 5 TABLET | Refills: 0 | Status: SHIPPED | OUTPATIENT
Start: 2024-02-01 | End: 2024-02-01

## 2024-02-01 RX ORDER — PHENAZOPYRIDINE HYDROCHLORIDE 100 MG/1
100 TABLET, FILM COATED ORAL ONCE
Status: COMPLETED | OUTPATIENT
Start: 2024-02-01 | End: 2024-02-01

## 2024-02-01 RX ORDER — PHENAZOPYRIDINE HYDROCHLORIDE 100 MG/1
100 TABLET, FILM COATED ORAL 3 TIMES DAILY PRN
Qty: 10 TABLET | Refills: 0 | Status: SHIPPED | OUTPATIENT
Start: 2024-02-01

## 2024-02-01 RX ORDER — ACETAMINOPHEN 325 MG/1
650 TABLET ORAL EVERY 6 HOURS PRN
Status: DISCONTINUED | OUTPATIENT
Start: 2024-02-01 | End: 2024-02-01 | Stop reason: HOSPADM

## 2024-02-01 RX ORDER — SODIUM CHLORIDE, SODIUM LACTATE, POTASSIUM CHLORIDE, CALCIUM CHLORIDE 600; 310; 30; 20 MG/100ML; MG/100ML; MG/100ML; MG/100ML
125 INJECTION, SOLUTION INTRAVENOUS CONTINUOUS
Status: DISCONTINUED | OUTPATIENT
Start: 2024-02-01 | End: 2024-02-01 | Stop reason: HOSPADM

## 2024-02-01 RX ORDER — PROPOFOL 10 MG/ML
INJECTION, EMULSION INTRAVENOUS AS NEEDED
Status: DISCONTINUED | OUTPATIENT
Start: 2024-02-01 | End: 2024-02-01

## 2024-02-01 RX ORDER — ONDANSETRON 2 MG/ML
4 INJECTION INTRAMUSCULAR; INTRAVENOUS ONCE AS NEEDED
Status: DISCONTINUED | OUTPATIENT
Start: 2024-02-01 | End: 2024-02-01 | Stop reason: HOSPADM

## 2024-02-01 RX ORDER — CEFUROXIME AXETIL 250 MG/1
250 TABLET ORAL EVERY 12 HOURS SCHEDULED
Qty: 6 TABLET | Refills: 0 | Status: SHIPPED | OUTPATIENT
Start: 2024-02-01 | End: 2024-02-04

## 2024-02-01 RX ORDER — SODIUM CHLORIDE, SODIUM LACTATE, POTASSIUM CHLORIDE, CALCIUM CHLORIDE 600; 310; 30; 20 MG/100ML; MG/100ML; MG/100ML; MG/100ML
50 INJECTION, SOLUTION INTRAVENOUS CONTINUOUS
Status: CANCELLED | OUTPATIENT
Start: 2024-02-01

## 2024-02-01 RX ADMIN — DEXAMETHASONE SODIUM PHOSPHATE 10 MG: 10 INJECTION, SOLUTION INTRAMUSCULAR; INTRAVENOUS at 07:35

## 2024-02-01 RX ADMIN — ONDANSETRON 4 MG: 2 INJECTION INTRAMUSCULAR; INTRAVENOUS at 07:35

## 2024-02-01 RX ADMIN — FENTANYL CITRATE 50 MCG: 50 INJECTION, SOLUTION INTRAMUSCULAR; INTRAVENOUS at 07:59

## 2024-02-01 RX ADMIN — ACETAMINOPHEN 650 MG: 325 TABLET, FILM COATED ORAL at 08:56

## 2024-02-01 RX ADMIN — CEFAZOLIN SODIUM 2000 MG: 2 SOLUTION INTRAVENOUS at 07:31

## 2024-02-01 RX ADMIN — LIDOCAINE HYDROCHLORIDE 50 MG: 10 INJECTION, SOLUTION EPIDURAL; INFILTRATION; INTRACAUDAL at 07:30

## 2024-02-01 RX ADMIN — SODIUM CHLORIDE, SODIUM LACTATE, POTASSIUM CHLORIDE, AND CALCIUM CHLORIDE 125 ML/HR: .6; .31; .03; .02 INJECTION, SOLUTION INTRAVENOUS at 07:14

## 2024-02-01 RX ADMIN — FENTANYL CITRATE 50 MCG: 50 INJECTION, SOLUTION INTRAMUSCULAR; INTRAVENOUS at 07:34

## 2024-02-01 RX ADMIN — OXYBUTYNIN CHLORIDE 5 MG: 5 TABLET, EXTENDED RELEASE ORAL at 08:56

## 2024-02-01 RX ADMIN — PHENAZOPYRIDINE 100 MG: 100 TABLET ORAL at 08:56

## 2024-02-01 RX ADMIN — MIDAZOLAM HYDROCHLORIDE 2 MG: 1 INJECTION, SOLUTION INTRAMUSCULAR; INTRAVENOUS at 07:25

## 2024-02-01 RX ADMIN — SODIUM CHLORIDE, SODIUM LACTATE, POTASSIUM CHLORIDE, AND CALCIUM CHLORIDE: .6; .31; .03; .02 INJECTION, SOLUTION INTRAVENOUS at 07:21

## 2024-02-01 RX ADMIN — GLYCOPYRROLATE 0.1 MG: 0.2 INJECTION, SOLUTION INTRAMUSCULAR; INTRAVENOUS at 07:25

## 2024-02-01 RX ADMIN — PROPOFOL 200 MG: 10 INJECTION, EMULSION INTRAVENOUS at 07:30

## 2024-02-01 RX ADMIN — PROPOFOL 80 MCG/KG/MIN: 10 INJECTION, EMULSION INTRAVENOUS at 07:30

## 2024-02-01 RX ADMIN — TAMSULOSIN HYDROCHLORIDE 0.4 MG: 0.4 CAPSULE ORAL at 08:56

## 2024-02-01 NOTE — ANESTHESIA POSTPROCEDURE EVALUATION
Post-Op Assessment Note    CV Status:  Stable    Pain management: adequate       Mental Status:  Lethargic and sleepy   Hydration Status:  Stable   PONV Controlled:  None   Airway Patency:  Patent     Post Op Vitals Reviewed: Yes    No anethesia notable event occurred.    Staff: CRNA               BP   126/75   Temp   97.8   Pulse  79   Resp   12   SpO2   100

## 2024-02-01 NOTE — OP NOTE
OPERATIVE REPORT  PATIENT NAME: Amanda Low    :  2000  MRN: [id number]  Pt Location:  OR ROOM 03    SURGERY DATE: 2024    Surgeons and Role:     * Kee Sethi MD - Primary    Preop Diagnosis:  Right ureteral stone [N20.1]    Post-Op Diagnosis Codes:     * Right ureteral stone [N20.1]    Procedure(s):  Right - CYSTOSCOPY URETEROSCOPY WITH RETROGRADE PYELOGRAM. Basket retrieval AND INSERTION STENT URETERAL    Specimen(s):  ID Type Source Tests Collected by Time Destination   A : right ureter stone Calculus Ureter, Right STONE ANALYSIS Kee Sethi MD 2024 [id number]        Estimated Blood Loss:   Minimal    Drains:  Ureteral Internal Stent Right ureter 6 Fr. (Active)   Number of days: 31       Ureteral Internal Stent Right ureter 6 Fr. (Active)   Number of days: 0       Anesthesia Type:   General    Operative Indications:  Right ureteral stone [N20.1]      Operative Findings:  No stone in the ureter on semirigid ureteroscopy  Single use disposable flexible ureteroscope utilized to remove free-floating stone in the renal pelvis with Skylight basket  Moderate dilation of the renal pelvis, 6 x 26 double-J stent placed with string    Complications:   None    Procedure and Technique:  Amanda Low is a 23 y.o.-year-old female with a 5 mm right sided stone.  Previously presented with sepsis and stent was placed.  They are proceeding to the operating room on 2024  to undergo right-sided ureteroscopy with holmium laser lithotripsy.      Risk and benefits of the procedure were discussed and reviewed.  Informed consent was obtained.  After the smooth induction of LMA anesthesia, the patient was placed in the dorsal lithotomy position.  Her genitalia was prepped and draped in a sterile fashion.  Intravenous antibiotics were administered in the form of ancef.  A timeout was performed with all members of the operative team confirming the patient's identity, procedure to be performed,  and laterality of the case.    A 22 Tongan rigid cystoscope with 30° lens was inserted.  The bladder was thoroughly inspected.  Attention was focused on the right ureteral orifice. There was a previously placed stent present. This was grasped with stent-graspers and removed to the level of the urethra meatus under fluoroscopic vision. Bard Solo Plus wire was then passed through the stent up to the level of the renal pelvis.    Because there was some difficulty passing the wire through the stent, cystoscope was performed and the wire was passed alongside the stent up to the level of the renal pelvis.     Stent was removed intact.  Alongside the existing wire, a semirigid ureteroscope was placed.  Inspection of the ureter did not demonstrate ureteral stone.  A second wire was placed through the scope and the semirigid scope was removed and a push pull fashion.      A single use disposable flexible ureteroscope was then passed over the working wire.  The kidney was thoroughly inspected.  A stone was identified in the renal pelvis.  A Bard SkyLite basket was then used to remove the stone intact..  Contrast was injected.  The kidney was thoroughly reinspected in the upper, inerpolar and lower pole regions.  There were no residual stones identified.      The flexible scope was slowly withdrawn through the ureter during a pull-out ureteroscopy.  There were no residual stones within the ureter.  The wire was backloaded through the cystoscope and a 6 Tongan 26cm double-J ureteral stent was then placed in the standard fashion.  The proximal coil was appreciated in the right renal pelvis and the distal coil was visualized within the bladder.  A string was left in place.  The bladder was emptied and the cystoscope was removed.  The string was secured to the mons pubis with Tegaderm.  2% viscous lidocaine was placed per urethra.    Overall the patient tolerated the procedure well and there were no complications.  The patient was  taken out of dorsal lithotomy, extubated in the operating room and transferred to the PACU in stable condition at the conclusion of the case.    Plan-stent on string removal in 3 to 5 days    Patient Disposition:  PACU         SIGNATURE: Kee Sethi MD  DATE: February 1, 2024  TIME: 8:09 AM

## 2024-02-01 NOTE — DISCHARGE INSTR - AVS FIRST PAGE
After your surgery today, a stent was left coiled in your kidney/ureter/bladder. This tube will help the area to safely heal and urine to drain unobstructed.     This stent has a small string that comes out of your urethra and is care taped and secured to your skin. Take great care over the next few days to ensure you do not pull on the string as you dress and clean yourself. Within the next 3-5 days our office will call you and give you step-by-step instructions for removal of the stent at home, which will be achieved by removing the tape and steadily pulling on the string. Once removed, the stent can be discarded.    The stent placed is not permanent and cannot be left in your body indefinitely and so careful removal will be paramount. If there are issue with removal at home, our office should be notified at [phone].    It is common to have some mild symptoms while the stent is in place. Pain with urination, feeling of needing to urinate frequency or urgently, flank discomfort or blood in the urine. Utilize the medications provided (flomax, ditropan, pyridium) as well as ibuprofen/motrin for pain control. Hydrate liberally with oral fluids.

## 2024-02-01 NOTE — INTERVAL H&P NOTE
H&P reviewed. After examining the patient I find no changes in the patients condition since the H&P had been written.  Patient with prior obstructed infected ureteral lithiasis.  Previously underwent stent placement.  Now returns for cystoscopy with right retrograde pyelogram, ureteroscopy with laser lithotripsy and basket extraction of stone.  Risk and benefits of the procedure discussed with the patient and her mother.  Plan for stent exchange and removal in short order was reviewed.  They have agreed to proceed and signed informed consent.  Right side marked.    Vitals:    02/01/24 0705   BP: 127/93   Pulse: 77   Resp: 16   Temp: 97.7 °F (36.5 °C)   SpO2: 100%

## 2024-02-01 NOTE — ANESTHESIA PREPROCEDURE EVALUATION
Procedure:  CYSTOSCOPY URETEROSCOPY WITH LITHOTRIPSY HOLMIUM LASER, RETROGRADE PYELOGRAM AND INSERTION STENT URETERAL (Right: Bladder)    Relevant Problems   /RENAL   (+) TERESSA (acute kidney injury) (HCC)      HEMATOLOGY   (+) Thrombocytopenia (HCC)        Physical Exam    Airway    Mallampati score: I  TM Distance: >3 FB  Neck ROM: full     Dental   No notable dental hx     Cardiovascular      Pulmonary      Other Findings  post-pubertal.      Anesthesia Plan  ASA Score- 2     Anesthesia Type- general with ASA Monitors.         Additional Monitors:     Airway Plan: LMA.           Plan Factors-Exercise tolerance (METS): >4 METS.    Chart reviewed.   Existing labs reviewed. Patient summary reviewed.    Patient is not a current smoker.      There is medical exclusion for perioperative obstructive sleep apnea risk education.        Induction- intravenous.    Postoperative Plan- Plan for postoperative opioid use.     Informed Consent- Anesthetic plan and risks discussed with patient.  I personally reviewed this patient with the CRNA. Discussed and agreed on the Anesthesia Plan with the CRNA..

## 2024-02-02 NOTE — TELEPHONE ENCOUNTER
Patient returned call to RN, she was on another call and will call her back shortly.    CB: [phone]

## 2024-02-02 NOTE — TELEPHONE ENCOUNTER
Returned call to patient.     Post Op Note    Amanda Low is a 23 y.o. female s/p CYSTOSCOPY URETEROSCOPY WITH RETROGRADE PYELOGRAM, Basket retrieval AND INSERTION STENT URETERAL (Right: Bladder)  performed 2/1/2024 by Dr. Sethi.     How would you rate your pain on a scale from 1 to 10, 10 being the worst pain ever? Not really any pain, just some intermittent discomfort     Have you had a fever? No    Have your bowel movements been regular? Yes    Do you have any difficulty urinating? No    Do you have any other questions or concerns that I can address at this time?     Patient doing well at this time. Taking tylenol prn which she states helps her a lot. Reviewed medications and advised on increased hydration. Discussed stent with string removal and patient plans to remove stent Monday evening.     Plan to contact patient on Tuesday to ensure stent was removed and schedule 3 month follow up with imaging prior. Patient prefers [place] office for follow up.

## 2024-02-02 NOTE — TELEPHONE ENCOUNTER
Called and LMOM for patient to return call to follow up with her s/p CYSTOSCOPY URETEROSCOPY WITH RETROGRADE PYELOGRAM, Basket retrieval AND INSERTION STENT URETERAL (Right: Bladder) with Dr. Sethi yesterday and discuss stent removal.

## 2024-02-06 NOTE — TELEPHONE ENCOUNTER
Called and left VM for patient to return call to confirm stent with string was removed and to schedule 3 month follow up with AP in the [place] office with US kidney and bladder and KUB prior.

## 2024-02-10 LAB
CALCIUM OXALATE DIHYDRATE MFR STONE IR: 70 %
COLOR STONE: NORMAL
COM MFR STONE: 20 %
COMMENT-STONE3: NORMAL
COMPOSITION: NORMAL
HYDROXYAPATITE 24H ENGDIFF UR: 10 %
LABORATORY COMMENT REPORT: NORMAL
PHOTO: NORMAL
SIZE STONE: NORMAL MM
SPEC SOURCE SUBJ: NORMAL
STONE ANALYSIS-IMP: NORMAL
WT STONE: 31 MG

## 2024-05-09 ENCOUNTER — TELEPHONE (OUTPATIENT)
Dept: UROLOGY | Facility: CLINIC | Age: 24
End: 2024-05-09

## 2024-05-09 NOTE — TELEPHONE ENCOUNTER
Spoke with pt informing pt she did not get her imaging done. I provided central scheduling number so she can make appt for the imaging then I informed the pt to call the office back to schedule a follow up appt. Pt verbalized understanding.     Today's appt is cancelled

## 2024-05-24 ENCOUNTER — HOSPITAL ENCOUNTER (EMERGENCY)
Facility: HOSPITAL | Age: 24
Discharge: HOME | End: 2024-05-24
Attending: EMERGENCY MEDICINE
Payer: COMMERCIAL

## 2024-05-24 VITALS
HEART RATE: 95 BPM | TEMPERATURE: 98.3 F | DIASTOLIC BLOOD PRESSURE: 88 MMHG | RESPIRATION RATE: 18 BRPM | SYSTOLIC BLOOD PRESSURE: 142 MMHG | OXYGEN SATURATION: 100 %

## 2024-05-24 DIAGNOSIS — Z01.419 NORMAL PELVIC EXAM: Primary | ICD-10-CM

## 2024-05-24 PROCEDURE — 99283 EMERGENCY DEPT VISIT LOW MDM: CPT

## 2024-05-24 PROCEDURE — NC001 PR NO CHARGE: Performed by: EMERGENCY MEDICINE

## 2024-05-24 NOTE — ED PROVIDER NOTES
History  Chief Complaint   Patient presents with    Foreign Body in Vagina     PT unsure if tampon is still in vagina. Tampon placed at 1700 yesterday. Pain in vaginal are but unsure if pain is caused from intercourse. Admits to slight vaginal bleeding currently.     23-year-old female with past medical history of obstructing kidney stone who is presenting with possible tampon stuck in vagina.  She states that she placed a tampon last night, she then went out and got intoxicated. She states she had intercourse and is unsure if she took the tampon out prior to. She states she currently is towards the end of her period and is having spotting. She states she has not felt anything this morning or in the shower when she checked for the tampon. She otherwise denies abdominal pain. She states she does have some vaginal pain. Price was unprotected.       History provided by:  Patient  Foreign Body in Vagina      Prior to Admission Medications   Prescriptions Last Dose Informant Patient Reported? Taking?   Multiple Vitamin (multivitamin) tablet   Yes No   Sig: Take 1 tablet by mouth daily   oxybutynin (Ditropan XL) 5 mg 24 hr tablet   No No   Sig: Take 1 tablet (5 mg total) by mouth daily at bedtime for 5 doses   phenazopyridine (PYRIDIUM) 100 mg tablet   No No   Sig: Take 1 tablet (100 mg total) by mouth 3 (three) times a day as needed for bladder spasms   tamsulosin (FLOMAX) 0.4 mg   No No   Sig: Take 1 capsule (0.4 mg total) by mouth daily with dinner for 5 doses      Facility-Administered Medications: None       Past Medical History:   Diagnosis Date    Acne vulgaris     Kidney stone     Sepsis (HCC)        Past Surgical History:   Procedure Laterality Date    FL RETROGRADE PYELOGRAM  01/01/2024    FL RETROGRADE PYELOGRAM  2/1/2024    OK CYSTO BLADDER W/URETERAL CATHETERIZATION Right 01/01/2024    Procedure: CYSTOSCOPY RETROGRADE PYELOGRAM WITH INSERTION STENT URETERAL;  Surgeon: Kee Sethi MD;   Location: [facility];  Service: Urology    NM CYSTO/URETERO W/LITHOTRIPSY &INDWELL STENT INSRT Right 2/1/2024    Procedure: CYSTOSCOPY URETEROSCOPY WITH RETROGRADE PYELOGRAM, Basket retrieval AND INSERTION STENT URETERAL;  Surgeon: Kee Sethi MD;  Location:  MAIN OR;  Service: Urology       Family History   Problem Relation Age of Onset    No Known Problems Mother     No Known Problems Father     Multiple sclerosis Maternal Grandmother     Diabetes Maternal Grandmother     Heart disease Maternal Grandfather     No Known Problems Paternal Grandmother     No Known Problems Paternal Grandfather     No Known Problems Sister     Alcohol abuse Neg Hx     Substance Abuse Neg Hx     Mental illness Neg Hx      I have reviewed and agree with the history as documented.    E-Cigarette/Vaping    E-Cigarette Use Former User     Comments very few times      E-Cigarette/Vaping Substances     Social History     Tobacco Use    Smoking status: Never    Smokeless tobacco: Never   Vaping Use    Vaping status: Former   Substance Use Topics    Alcohol use: Yes     Alcohol/week: 2.0 standard drinks of alcohol     Types: 2 Standard drinks or equivalent per week     Comment: social    Drug use: Never       Review of Systems    Physical Exam  Physical Exam  Vitals reviewed. Exam conducted with a chaperone present.   Constitutional:       General: She is not in acute distress.     Appearance: Normal appearance. She is not ill-appearing or toxic-appearing.   HENT:      Head: Normocephalic and atraumatic.      Mouth/Throat:      Mouth: Mucous membranes are moist.   Eyes:      General: No scleral icterus.     Extraocular Movements: Extraocular movements intact.      Conjunctiva/sclera: Conjunctivae normal.   Cardiovascular:      Rate and Rhythm: Normal rate and regular rhythm.      Pulses: Normal pulses.      Heart sounds: No murmur heard.  Pulmonary:      Effort: Pulmonary effort is normal. No respiratory distress.      Breath  sounds: Normal breath sounds. No wheezing.   Abdominal:      General: Abdomen is flat. Bowel sounds are normal. There is no distension.      Palpations: Abdomen is soft.      Tenderness: There is no abdominal tenderness. There is no guarding or rebound.   Genitourinary:     General: Normal vulva.      Vagina: Normal. No foreign body. No vaginal discharge or erythema.      Cervix: Normal.      Comments: Speculum exam performed with chaperone present.  There is no foreign body identified on exam. Vaginal walls were pink, no bleeding identified. No external or internal trauma identified.   Musculoskeletal:         General: Normal range of motion.   Skin:     General: Skin is warm.      Capillary Refill: Capillary refill takes less than 2 seconds.   Neurological:      General: No focal deficit present.      Mental Status: She is alert and oriented to person, place, and time.      Sensory: No sensory deficit.         Vital Signs  ED Triage Vitals [05/24/24 1602]   Temperature Pulse Respirations Blood Pressure SpO2   98.3 °F (36.8 °C) 95 18 142/88 100 %      Temp Source Heart Rate Source Patient Position - Orthostatic VS BP Location FiO2 (%)   Oral Monitor Sitting Left arm --      Pain Score       --           Vitals:    05/24/24 1602   BP: 142/88   Pulse: 95   Patient Position - Orthostatic VS: Sitting         Visual Acuity      ED Medications  Medications - No data to display    Diagnostic Studies  Results Reviewed       Procedure Component Value Units Date/Time    UA w Reflex to Microscopic w Reflex to Culture [[id number]]     Lab Status: No result Specimen: Urine, Clean Catch     POCT pregnancy, urine [[id number]]     Lab Status: No result     Chlamydia/GC amplified DNA by PCR [[id number]]     Lab Status: No result                    No orders to display              Procedures  Procedures         ED Course                                             Medical Decision Making  23-year-old female presenting over concern of  retained foreign body in her vagina.  Pelvic exam performed at bedside with chaperone present.  This did not reveal any foreign body. Patient tolerated exam. We discussed testing for STD. We discussed plan B. Patient states she would prefer to watch and wait.  She was given education on signs and symptoms concerning for STDs. We discussed use of condoms in the future.     Return to ED precautions: Fevers, chills, abdominal pain, discolored vaginal discharge, dyspareunia.   Patient expressed understanding, she stated she will follow up with her pcp if she has any symptoms.     Amount and/or Complexity of Data Reviewed  Labs: ordered.             Disposition  Final diagnoses:   Normal pelvic exam     Time reflects when diagnosis was documented in both MDM as applicable and the Disposition within this note       Time User Action Codes Description Comment    5/24/2024  5:19 PM Marysol Borja Add [Z01.419] Normal pelvic exam           ED Disposition       ED Disposition   Discharge    Condition   Stable    Date/Time   Fri May 24, 2024  5:19 PM    Comment   Amanda Low discharge to home/self care.                   Follow-up Information    None         Discharge Medication List as of 5/24/2024  5:22 PM        CONTINUE these medications which have NOT CHANGED    Details   Multiple Vitamin (multivitamin) tablet Take 1 tablet by mouth daily, Historical Med      oxybutynin (Ditropan XL) 5 mg 24 hr tablet Take 1 tablet (5 mg total) by mouth daily at bedtime for 5 doses, Starting u 2/1/2024, Until Tue 2/6/2024, Normal      phenazopyridine (PYRIDIUM) 100 mg tablet Take 1 tablet (100 mg total) by mouth 3 (three) times a day as needed for bladder spasms, Starting u 2/1/2024, Normal      tamsulosin (FLOMAX) 0.4 mg Take 1 capsule (0.4 mg total) by mouth daily with dinner for 5 doses, Starting Thu 2/1/2024, Until Tue 2/6/2024, Normal             No discharge procedures on file.    PDMP Review       None            ED  Provider  Electronically Signed by             Marysol Borja PA-C  05/24/24 5566

## 2024-05-24 NOTE — DISCHARGE INSTRUCTIONS
You were seen and evaluated in the ED today for concern of vaginal foreign body. Your exam was normal. Recommend follow up with PCP/gyn    Return to ED precautions: fevers, chills, green/yellow vaginal discharge, pain with intercourse, vaginal pain, pain with urination, abdominal pain, nausea/vomiting.

## 2024-05-31 PROBLEM — R65.21 SEPTIC SHOCK (HCC): Status: RESOLVED | Noted: 2024-01-02 | Resolved: 2024-05-31

## 2024-05-31 PROBLEM — D69.6 THROMBOCYTOPENIA (HCC): Status: RESOLVED | Noted: 2024-01-04 | Resolved: 2024-05-31

## 2024-05-31 PROBLEM — A41.9 SEPTIC SHOCK (HCC): Status: RESOLVED | Noted: 2024-01-02 | Resolved: 2024-05-31

## 2024-05-31 PROBLEM — N17.9 AKI (ACUTE KIDNEY INJURY) (HCC): Status: RESOLVED | Noted: 2024-01-02 | Resolved: 2024-05-31

## 2024-05-31 NOTE — PROGRESS NOTES
"Ambulatory Visit  Name: Amanda Low      : 2000      MRN: [id number]  Encounter Provider: Mitzi Cleveland DO  Encounter Date: 6/3/2024   Encounter department: [facility]    Assessment & Plan   1. Healthcare maintenance  2. Screen for STD (sexually transmitted disease)  -     HIV 1/2 AG/AB w Reflex [facility] for 2 yr old and above  -     Hepatitis panel, acute; Future  -     Chlamydia/GC amplified DNA by PCR; Future  -     RPR-Syphilis Screening (Total Syphilis IGG/IGM); Future  3. Encounter for immunization  -     TDAP VACCINE GREATER THAN OR EQUAL TO 6YO IM    BP WNL   Defers labs   STD screening ordered, pt agreeable   Pap DUE -- scheduled with Gyn, also going to discuss LARCs   Vaccinations: HPV UTD, TDap given, COVID completed primary   Encouraged regular physical activity, varied diet, and regular dental/eye exams        History of Present Illness     HPI    Had sepsis secondary to kidney stone in 2024 -- needs to schedule f/u with Urology, currently asymptomatic     Review of Systems   Constitutional:  Negative for unexpected weight change.   HENT:  Positive for sneezing (thinks it is due to pollen). Negative for congestion, ear pain, rhinorrhea and sore throat (last week, has resolved).    Eyes:  Negative for visual disturbance.   Respiratory:  Negative for cough (\"here and there\" -- thinks it is due allergies) and shortness of breath.    Cardiovascular:  Negative for chest pain, palpitations and leg swelling.   Gastrointestinal:  Negative for abdominal pain, blood in stool, constipation and diarrhea.   Endocrine: Negative for polyuria.   Genitourinary:  Negative for difficulty urinating, dysuria, hematuria and menstrual problem.   Neurological:  Negative for dizziness and headaches.   Psychiatric/Behavioral:  Negative for sleep disturbance.      Medical History Reviewed by provider this encounter:  Tobacco  Allergies  Meds  Problems  Med Hx  Surg Hx  Fam Hx     " "  Objective     /76   Pulse 65   Temp 97.6 °F (36.4 °C)   Ht 5' 7.5\" (1.715 m)   Wt 79.7 kg (175 lb 9.6 oz)   LMP 05/19/2024 (Exact Date)   SpO2 98%   BMI 27.10 kg/m²     Physical Exam  Vitals and nursing note reviewed.   Constitutional:       General: She is not in acute distress.     Appearance: She is well-developed.   HENT:      Head: Normocephalic and atraumatic.      Right Ear: Ear canal and external ear normal. There is impacted cerumen.      Left Ear: Ear canal and external ear normal. There is impacted cerumen.      Nose: Nose normal. No rhinorrhea.      Mouth/Throat:      Mouth: Mucous membranes are moist.      Pharynx: No oropharyngeal exudate or posterior oropharyngeal erythema.   Eyes:      Conjunctiva/sclera: Conjunctivae normal.   Neck:      Thyroid: No thyromegaly.   Cardiovascular:      Rate and Rhythm: Normal rate and regular rhythm.   Pulmonary:      Effort: Pulmonary effort is normal. No respiratory distress.      Breath sounds: Normal breath sounds.   Abdominal:      General: Bowel sounds are normal. There is no distension.      Palpations: Abdomen is soft.      Tenderness: There is no abdominal tenderness. There is no right CVA tenderness or left CVA tenderness.   Musculoskeletal:      Right lower leg: No edema.      Left lower leg: No edema.   Lymphadenopathy:      Cervical: No cervical adenopathy.   Skin:     General: Skin is warm and dry.   Neurological:      Mental Status: She is alert.      Comments: Grossly intact   Psychiatric:         Mood and Affect: Mood normal.       Administrative Statements           "

## 2024-06-03 ENCOUNTER — OFFICE VISIT (OUTPATIENT)
Dept: FAMILY MEDICINE CLINIC | Facility: CLINIC | Age: 24
End: 2024-06-03
Payer: COMMERCIAL

## 2024-06-03 VITALS
HEIGHT: 68 IN | WEIGHT: 175.6 LBS | DIASTOLIC BLOOD PRESSURE: 76 MMHG | SYSTOLIC BLOOD PRESSURE: 118 MMHG | TEMPERATURE: 97.6 F | BODY MASS INDEX: 26.61 KG/M2 | OXYGEN SATURATION: 98 % | HEART RATE: 65 BPM

## 2024-06-03 DIAGNOSIS — Z23 ENCOUNTER FOR IMMUNIZATION: ICD-10-CM

## 2024-06-03 DIAGNOSIS — Z00.00 HEALTHCARE MAINTENANCE: Primary | ICD-10-CM

## 2024-06-03 DIAGNOSIS — Z11.3 SCREEN FOR STD (SEXUALLY TRANSMITTED DISEASE): ICD-10-CM

## 2024-06-03 PROBLEM — Z30.41 SURVEILLANCE OF CONTRACEPTIVE PILL: Status: RESOLVED | Noted: 2023-06-15 | Resolved: 2024-06-03

## 2024-06-03 PROCEDURE — 90715 TDAP VACCINE 7 YRS/> IM: CPT

## 2024-06-03 PROCEDURE — 99395 PREV VISIT EST AGE 18-39: CPT | Performed by: FAMILY MEDICINE

## 2024-06-03 PROCEDURE — 90471 IMMUNIZATION ADMIN: CPT

## 2024-06-20 ENCOUNTER — ANNUAL EXAM (OUTPATIENT)
Age: 24
End: 2024-06-20
Payer: COMMERCIAL

## 2024-06-20 VITALS
HEIGHT: 68 IN | WEIGHT: 178 LBS | DIASTOLIC BLOOD PRESSURE: 78 MMHG | SYSTOLIC BLOOD PRESSURE: 116 MMHG | BODY MASS INDEX: 26.98 KG/M2

## 2024-06-20 DIAGNOSIS — Z11.3 SCREENING FOR STDS (SEXUALLY TRANSMITTED DISEASES): ICD-10-CM

## 2024-06-20 DIAGNOSIS — Z30.09 BIRTH CONTROL COUNSELING: ICD-10-CM

## 2024-06-20 DIAGNOSIS — Z01.419 ENCOUNTER FOR GYNECOLOGICAL EXAMINATION WITHOUT ABNORMAL FINDING: Primary | ICD-10-CM

## 2024-06-20 PROCEDURE — G0145 SCR C/V CYTO,THINLAYER,RESCR: HCPCS | Performed by: NURSE PRACTITIONER

## 2024-06-20 PROCEDURE — 99395 PREV VISIT EST AGE 18-39: CPT | Performed by: NURSE PRACTITIONER

## 2024-06-20 PROCEDURE — 87491 CHLMYD TRACH DNA AMP PROBE: CPT | Performed by: NURSE PRACTITIONER

## 2024-06-20 PROCEDURE — 87591 N.GONORRHOEAE DNA AMP PROB: CPT | Performed by: NURSE PRACTITIONER

## 2024-06-20 NOTE — PROGRESS NOTES
Diagnoses and all orders for this visit:    Encounter for gynecological examination without abnormal finding  -     Liquid-based pap, screening    Birth control counseling    Screening for STDs (sexually transmitted diseases)  -     Chlamydia/GC amplified DNA by PCR      Calcium/vit d inclusion in the diet discussed, call with any issues, SBE reinforced, all concerns addressed. IUD handouts given so she can decide which one she would like.        Pleasant 23 y.o.  premenopausal female here for annual exam. She denies any issues with bleeding or her menses. She reports regular cycles every 20 days that last 6 days. She had a Pap and GC/CT done today. She denies vaginal issues but is itchy lately. Denies pelvic pain. She declines a BCM but we discussed all forms she can try. Patient is interested in starting a birth control method for pregnancy prevention. After discussion of all birth control methods pt opted to think about the IUDs but is leaning toward the Paragard. Risks, benefits and side effects were discussed. All questions were addressed. Gardasil series completed [id number]. Sexually active without any concerns. She sees Urology for sepsis f/u and kidney stone back in Jan 2024    Past Medical History:   Diagnosis Date    Acne vulgaris     TERESSA (acute kidney injury) ([facility]) 01/02/2024    Kidney stone     Sepsis ([facility])     Septic shock ([facility]) 01/02/2024    Thrombocytopenia ([facility]) 01/04/2024     Past Surgical History:   Procedure Laterality Date    FL RETROGRADE PYELOGRAM  01/01/2024    FL RETROGRADE PYELOGRAM  2/1/2024    MI CYSTO BLADDER W/URETERAL CATHETERIZATION Right 01/01/2024    Procedure: CYSTOSCOPY RETROGRADE PYELOGRAM WITH INSERTION STENT URETERAL;  Surgeon: Kee Sethi MD;  Location: [facility] OR;  Service: Urology    MI CYSTO/URETERO W/LITHOTRIPSY &INDWELL STENT INSRT Right 2/1/2024    Procedure: CYSTOSCOPY URETEROSCOPY WITH RETROGRADE PYELOGRAM, Basket retrieval AND INSERTION STENT URETERAL;   "Surgeon: Kee Sethi MD;  Location:  MAIN OR;  Service: Urology     Family History   Problem Relation Age of Onset    No Known Problems Mother     No Known Problems Father     Multiple sclerosis Maternal Grandmother     Diabetes Maternal Grandmother     Heart disease Maternal Grandfather     No Known Problems Paternal Grandmother     No Known Problems Paternal Grandfather     No Known Problems Sister     Alcohol abuse Neg Hx     Substance Abuse Neg Hx     Mental illness Neg Hx      Social History     Tobacco Use    Smoking status: Never    Smokeless tobacco: Never   Vaping Use    Vaping status: Former   Substance Use Topics    Alcohol use: Yes     Alcohol/week: 2.0 standard drinks of alcohol     Types: 2 Standard drinks or equivalent per week     Comment: social    Drug use: No       Current Outpatient Medications:     Multiple Vitamin (multivitamin) tablet, Take 1 tablet by mouth daily, Disp: , Rfl:     oxybutynin (Ditropan XL) 5 mg 24 hr tablet, Take 1 tablet (5 mg total) by mouth daily at bedtime for 5 doses, Disp: 5 tablet, Rfl: 0    tamsulosin (FLOMAX) 0.4 mg, Take 1 capsule (0.4 mg total) by mouth daily with dinner for 5 doses, Disp: 5 capsule, Rfl: 0  Patient Active Problem List    Diagnosis Date Noted    Right ureteral stone 2024       No Known Allergies    OB History    Para Term  AB Living   0 0 0 0 0 0   SAB IAB Ectopic Multiple Live Births   0 0 0 0 0     At [facility], trying for dental hygienist  Also works in a [employer] as a [employer]     Vitals:    24 1011   BP: 116/78   Weight: 80.7 kg (178 lb)   Height: 5' 7.5\" (1.715 m)       Body mass index is 27.47 kg/m².    Review of Systems   Constitutional: Negative for chills, fatigue, fever and unexpected weight change.   Respiratory: Negative for shortness of breath.    Gastrointestinal: Negative for anal bleeding, blood in stool, constipation and diarrhea.   Genitourinary: Negative for difficulty " urinating, dysuria and hematuria. Sees Urology.    Physical Exam   Constitutional: She appears well-developed and well-nourished. No distress.   HENT: normal, no gross abnormalities noted  Head: Normocephalic.   Neck: Normal range of motion. Neck supple.   Pulmonary: Effort normal.  Breasts: bilateral without masses, skin changes or nipple discharge. Bilaterally soft and warm to touch. No areas of erythema or pain.  Abdominal: Soft.   Pelvic exam was performed with patient supine. No labial fusion. There is no rash, tenderness, lesion or injury on the right labia. There is no rash, tenderness, lesion or injury on the left labia. Uterus is not deviated, not enlarged, not fixed and not tender. Cervix exhibits no motion tenderness, no discharge and no friability. Right adnexum displays no mass, no tenderness and no fullness. Left adnexum displays no mass, no tenderness and no fullness. No erythema or tenderness in the vagina. No foreign body in the vagina. No signs of injury around the vagina. No vaginal discharge found.   Lymphadenopathy:        Right: No inguinal adenopathy present.        Left: No inguinal adenopathy present.

## 2024-06-20 NOTE — PATIENT INSTRUCTIONS
Intrauterine Device   WHAT YOU NEED TO KNOW:   What is an intrauterine device (IUD)?  An IUD is a type of birth control that is inserted into your uterus. It is a small, flexible piece of plastic with a string on the end. It is inserted and removed by your healthcare provider. IUDs prevent sperm from reaching or fertilizing an egg. IUDs also prevent a fertilized egg from attaching to the uterus and developing into a fetus.       What are the most common types of IUDs?  Your healthcare provider will recommend the type of IUD that is right for you. This is based on your age and if you have had a child. If you have not had a child, a smaller IUD will be used.     A copper IUD  slowly releases a small amount of copper into your uterus. This IUD can remain in place for up to 10 years.    A hormone-releasing IUD  slowly releases a small amount of progesterone into your uterus. Progesterone is a hormone that is made by your body to help control your periods. This IUD can remain in place for 3 to 5 years.  What are the advantages of an IUD?   An IUD is 98% to 99% effective in preventing pregnancy.    The IUD can be removed by your healthcare provider if you decide to have a baby. You may be able to get pregnant as soon as the IUD is removed.    An IUD protects you from pregnancy right after it is inserted.    You do not have to stop sexual activity to insert it. You do not have to remember to take your birth control pill.    Copper IUDs are safer for some women than oral birth control pills. Examples include women who smoke or have a history of blood clots.    Hormone-releasing IUDs may decrease certain health problems. Examples include bleeding and cramping that happen with your monthly period.    What are the disadvantages of an IUD?   There is a small chance that you could get pregnant. Sometimes the IUD cannot be removed after you get pregnant. This increases your risk of a miscarriage or an ectopic pregnancy. Ectopic  pregnancy is when the fertilized egg starts to grow somewhere other than your uterus.    An IUD does not protect you from sexually transmitted infections.    You may have cramps during the first weeks after you get the IUD.    A copper IUD may cause your monthly period to be heavier or more painful. This is more common within the first 3 months after you get the IUD. You may need to have your IUD removed if your bleeding or pain becomes severe. You may have spotting between periods.    There is a small risk of an infection within the first 20 days after the IUD is placed. Infection can lead to pelvic inflammatory disease. This can cause infertility.    Your uterus may tear when the IUD is inserted. The IUD may slip part or all of the way out of your uterus.    How is the IUD inserted?   The IUD is usually inserted during your monthly period. This may help decrease the amount of discomfort you have during the procedure. It also helps make sure that you are not pregnant. You will be asked to lie down and place your feet in stirrups. Your healthcare provider will gently insert a speculum into your vagina. This is the same tool used during a Pap smear. The speculum allows your healthcare provider to see inside your vagina to your cervix. The cervix is the opening of your uterus.    Your healthcare provider will clean your cervix with an antiseptic solution to prevent infection. You may be given numbing medicine. A long plastic tube is gently passed through your cervix and into your uterus. This tube has the IUD inside of it. The IUD is pushed out of the tube and into your uterus. You may have cramps as the IUD is inserted. The tube is removed after the IUD is in place.    How can I make sure my IUD is still in place?  An IUD has a string made of plastic thread. One to 2 inches of this string hangs into your vagina. You cannot see this string, and it should not cause problems when you have sex. Check your IUD string  every 3 days for the first 3 months after it is inserted. After that, check the string after each monthly period. Do the following to check the placement of your IUD:  Wash your hands with soap and warm water. Dry them with a clean towel.    Bend your knees and squat low to the ground.    Gently put your index finger inside your vagina. The cervix is at the top of the vagina and feels like the tip of your nose. Feel for the IUD string. Do not pull on the string. You should not be able to feel the firm plastic of the IUD itself.    Wash your hands after you check your IUD string.    Where can I find more information?   [facility]  [address] [address]  Phone: [phone] [phone]  Web Address: http://www.[email or web address].[employer]Sense    When should I seek immediate care?   You have severe pain or bleeding during your period.    You have a fever and severe abdominal pain.    When should I call my doctor?   You think you are pregnant.    The IUD has come out.    You have bleeding from your vagina after you have sex, and it is not your period.    You have pain during sex.    You cannot feel the IUD string, the string feels longer, or you feel the plastic of the IUD itself.    You have vaginal discharge that is green, yellow, or has a foul odor.    You have questions or concerns about your condition or care.    CARE AGREEMENT:   You have the right to help plan your care. Learn about your health condition and how it may be treated. Discuss treatment options with your healthcare providers to decide what care you want to receive. You always have the right to refuse treatment. The above information is an  only. It is not intended as medical advice for individual conditions or treatments. Talk to your doctor, nurse or pharmacist before following any medical regimen to see if it is safe and effective for you.  © Copyright [employer] 2023 Information is for End User's use  only and may not be sold, redistributed or otherwise used for commercial purposes.  Breast Self Exam for Women   AMBULATORY CARE:   A breast self-exam (BSE)  is a way to check your breasts for lumps and other changes. Regular BSEs can help you know how your breasts normally look and feel. Most breast lumps or changes are not cancer, but you should always have them checked by a healthcare provider.  Why you should do a BSE:  Breast cancer is the most common type of cancer in women. Even if you have mammograms, you may still want to do a BSE regularly. If you know how your breasts normally feel and look, it may help you know when to contact your healthcare provider. Mammograms can miss some cancers. You may find a lump during a BSE that did not show up on a mammogram.  When you should do a BSE:  If you have periods, you may want to do your BSE 1 week after your period ends. This is the time when your breasts may be the least swollen, lumpy, or tender. You can do regular BSEs even if you are breastfeeding or have breast implants.  Call your doctor if:   You find any lumps or changes in your breasts.    You have breast pain or fluid coming from your nipples.    You have questions or concerns about your condition or care.    How to do a BSE:       Look at your breasts in a mirror.  Look at the size and shape of each breast and nipple. Check for swelling, lumps, dimpling, scaly skin, or other skin changes. Look for nipple changes, such as a nipple that is painful or beginning to pull inward. Gently squeeze both nipples and check to see if fluid (that is not breast milk) comes out of them. If you find any of these or other breast changes, contact your healthcare provider. Check your breasts while you sit or  the following 3 positions:    Hang your arms down at your sides.    Raise your hands and join them behind your head.    Put firm pressure with your hands on your hips. Bend slightly forward while you look at  your breasts in the mirror.    Lie down and feel your breasts.  When you lie down, your breast tissue spreads out evenly over your chest. This makes it easier for you to feel for lumps and anything that may not be normal for your breasts. Do a BSE on one breast at a time.    Place a small pillow or towel under your left shoulder.  Put your left arm behind your head.    Use the 3 middle fingers of your right hand.  Use your fingertip pads, on the top of your fingers. Your fingertip pad is the most sensitive part of your finger.    Use small circles to feel your breast tissue.  Use your fingertip pads to make dime-sized, overlapping circles on your breast and armpits. Use light, medium, and firm pressure. First, press lightly. Second, press with medium pressure to feel a little deeper into the breast. Last, use firm pressure to feel deep within your breast.    Examine your entire breast area.  Examine the breast area from above the breast to below the breast where you feel only ribs. Make small circles with your fingertips, starting in the middle of your armpit. Make circles going up and down the breast area. Continue toward your breast and all the way across it. Examine the area from your armpit all the way over to the middle of your chest (breastbone). Stop at the middle of your chest.    Move the pillow or towel to your right shoulder, and put your right arm behind your head.  Use the 3 fingertip pads of your left hand, and repeat the above steps to do a BSE on your right breast.  What else you can do to check for breast problems or cancer:  Talk to your healthcare provider about mammograms. A mammogram is an x-ray of your breasts to screen for breast cancer or other problems. Your provider can tell you the benefits and risks of mammograms. The first mammogram is usually at age 45 or 50. Your provider may recommend you start at 40 or younger if your risk for breast cancer is high. Mammograms usually continue every  1 to 2 years until age 74.       Follow up with your doctor as directed:  Write down your questions so you remember to ask them during your visits.  © Copyright [employer] 2023 Information is for End User's use only and may not be sold, redistributed or otherwise used for commercial purposes.  The above information is an  only. It is not intended as medical advice for individual conditions or treatments. Talk to your doctor, nurse or pharmacist before following any medical regimen to see if it is safe and effective for you.

## 2024-06-24 LAB
C TRACH DNA SPEC QL NAA+PROBE: NEGATIVE
N GONORRHOEA DNA SPEC QL NAA+PROBE: NEGATIVE

## 2024-06-25 LAB
LAB AP GYN PRIMARY INTERPRETATION: NORMAL
Lab: NORMAL
PATH INTERP SPEC-IMP: NORMAL

## 2024-06-27 DIAGNOSIS — B37.49 CANDIDA INFECTION OF GENITAL REGION: Primary | ICD-10-CM

## 2024-06-27 RX ORDER — FLUCONAZOLE 150 MG/1
150 TABLET ORAL ONCE
Qty: 2 TABLET | Refills: 0 | Status: SHIPPED | OUTPATIENT
Start: 2024-06-27 | End: 2024-06-27

## 2025-05-02 ENCOUNTER — TELEPHONE (OUTPATIENT)
Dept: FAMILY MEDICINE CLINIC | Facility: CLINIC | Age: 25
End: 2025-05-02

## 2025-05-02 NOTE — TELEPHONE ENCOUNTER
Left voicemail for pt to let her know physical is scheduled early. Not due until after 6/3. If pt has other things to discuss can keep appt but insurance may not cover early physical.

## 2025-06-23 NOTE — PROGRESS NOTES
"Name: Amanda Low      : 2000      MRN: [id number]  Encounter Provider: Mitzi Cleveland DO  Encounter Date: 2025   Encounter department: [facility]  :  Assessment & Plan  Healthcare maintenance  BP WNL   CMP + Lipids to screen for HLD, DM   STD screening ordered by Gyn   Pap UTD   Vaccinations: TDap UTD   Encouraged regular physical activity, varied diet, and regular dental/eye exams          Right ureteral stone    Orders:  •  Ambulatory referral to Urology; Future    Screening for diabetes mellitus    Orders:  •  Comprehensive metabolic panel; Future    Screening, lipid    Orders:  •  Lipid panel; Future           History of Present Illness   HPI    Pt presents for annual physical     Needs a new referral to f/u with urology     Review of Systems   Constitutional:  Negative for unexpected weight change.   HENT:  Negative for congestion, ear pain, rhinorrhea and sore throat.    Eyes:  Negative for visual disturbance.   Respiratory:  Negative for cough and shortness of breath.    Cardiovascular:  Negative for chest pain, palpitations and leg swelling.   Gastrointestinal:  Positive for diarrhea (notes she hasn't been eating well/exercising -- would like to try adjusting this first and f/u if no improvement). Negative for abdominal pain, blood in stool and constipation.   Endocrine: Negative for polyuria.   Genitourinary:  Negative for dysuria and hematuria.   Neurological:  Negative for dizziness and headaches.   Psychiatric/Behavioral:  Negative for sleep disturbance.        Objective   /74   Pulse 103   Temp 98 °F (36.7 °C)   Ht 5' 7.5\" (1.715 m)   Wt 87.5 kg (193 lb)   LMP 2025   SpO2 95%   BMI 29.78 kg/m²      Physical Exam    "

## 2025-06-24 ENCOUNTER — OFFICE VISIT (OUTPATIENT)
Dept: FAMILY MEDICINE CLINIC | Facility: CLINIC | Age: 25
End: 2025-06-24
Payer: COMMERCIAL

## 2025-06-24 ENCOUNTER — ANNUAL EXAM (OUTPATIENT)
Age: 25
End: 2025-06-24
Payer: COMMERCIAL

## 2025-06-24 VITALS
HEIGHT: 68 IN | SYSTOLIC BLOOD PRESSURE: 110 MMHG | WEIGHT: 193 LBS | DIASTOLIC BLOOD PRESSURE: 74 MMHG | OXYGEN SATURATION: 95 % | TEMPERATURE: 98 F | BODY MASS INDEX: 29.25 KG/M2 | HEART RATE: 103 BPM

## 2025-06-24 VITALS
WEIGHT: 192.2 LBS | SYSTOLIC BLOOD PRESSURE: 102 MMHG | HEIGHT: 68 IN | DIASTOLIC BLOOD PRESSURE: 78 MMHG | BODY MASS INDEX: 29.13 KG/M2

## 2025-06-24 DIAGNOSIS — N20.1 RIGHT URETERAL STONE: ICD-10-CM

## 2025-06-24 DIAGNOSIS — Z13.220 SCREENING, LIPID: ICD-10-CM

## 2025-06-24 DIAGNOSIS — Z11.3 SCREENING FOR STDS (SEXUALLY TRANSMITTED DISEASES): ICD-10-CM

## 2025-06-24 DIAGNOSIS — Z00.00 HEALTHCARE MAINTENANCE: Primary | ICD-10-CM

## 2025-06-24 DIAGNOSIS — Z13.1 SCREENING FOR DIABETES MELLITUS: ICD-10-CM

## 2025-06-24 DIAGNOSIS — B37.49 CANDIDA INFECTION OF GENITAL REGION: ICD-10-CM

## 2025-06-24 DIAGNOSIS — Z01.419 ENCOUNTER FOR ANNUAL ROUTINE GYNECOLOGICAL EXAMINATION: Primary | ICD-10-CM

## 2025-06-24 PROCEDURE — 87591 N.GONORRHOEAE DNA AMP PROB: CPT | Performed by: NURSE PRACTITIONER

## 2025-06-24 PROCEDURE — 87491 CHLMYD TRACH DNA AMP PROBE: CPT | Performed by: NURSE PRACTITIONER

## 2025-06-24 PROCEDURE — 99395 PREV VISIT EST AGE 18-39: CPT | Performed by: NURSE PRACTITIONER

## 2025-06-24 PROCEDURE — 99395 PREV VISIT EST AGE 18-39: CPT | Performed by: FAMILY MEDICINE

## 2025-06-24 RX ORDER — TERCONAZOLE 4 MG/G
1 CREAM VAGINAL
Qty: 45 G | Refills: 0 | Status: SHIPPED | OUTPATIENT
Start: 2025-06-24 | End: 2025-06-24

## 2025-06-24 NOTE — PATIENT INSTRUCTIONS
"Patient Education     Vulvovaginal yeast infection   The Basics   Written by the doctors and editors at [facility]   What is a vulvovaginal yeast infection? -- This is an infection that causes itching and irritation of the vulva, the outer lips of the vagina (figure 1). The infection is usually caused by a fungus called \"Candida.\" (Yeast are a type of fungus.)  What causes yeast infections? -- The fungus that causes yeast infections normally lives in the vagina and the gut. Even though the yeast are there, they do not usually cause symptoms. Certain medicines (especially antibiotics), stress, and other things can cause the fungus to grow more than it should. When that happens, a yeast infection can start.  Your risk of getting a yeast infection is higher if you:   Have diabetes   Are pregnant   Have a weaker-than-normal immune system  Yeast infections are not usually spread through sex.  What are the symptoms of a yeast infection? -- Symptoms include:   Itching of the vulva (this is the most common symptom)   Pain, redness, or irritation of the vulva and vagina   Pain when you urinate   Pain during sex   Abnormal vaginal discharge, which might be thick and white or thin and watery  The symptoms of a yeast infection are a lot like the symptoms of many other conditions. The best way to find out what is causing your symptoms is to see your doctor or nurse. This way, you can get the right treatment.  Is there a test for yeast infection? -- Yes. Your doctor or nurse will use a swab to get a sample of fluid from your vagina. Then, they will put it under a microscope and look for the fungus that causes yeast infections. Sometimes, they might do a test to find out which type of yeast you have.  Depending on your situation, your doctor or nurse might do other tests on your vaginal fluid, too. One common test checks for yeast infections as well as bacterial vaginosis and trichomoniasis. These are other infections that can also " cause itching and irritation.  How are yeast infections treated? -- Yeast infections are treated with medicines. All medicines for yeast infections work by killing the fungus that causes the infections. They come in different forms:   A pill you take by mouth   Medicines you put in your vagina and on your vulva - These come as creams or tablets. This is the preferred treatment for pregnant people.  When will I feel better? -- You will probably feel better within a few days of starting treatment. If you do not get better after you finish treatment, see your doctor or nurse again. You might need to take more medicine or a different medicine.  What if I get yeast infections often? -- Tell your doctor or nurse. They can help find out whether your symptoms are caused by a yeast infection and, if so, which type of yeast. There are a few different types of yeast, and they respond to different treatments. Plus, the same symptoms that you get with a yeast infection can sometimes be caused by other types of infections, an allergy, or other problems. If you get frequent infections, you might need a different treatment than what you tried in the past.  When should I call the doctor? -- Call your doctor or nurse for advice if your symptoms do not get better after treatment. It might take up to a week for symptoms to improve.  All topics are updated as new evidence becomes available and our peer review process is complete.  This topic retrieved from [employer] on: Feb 26, 2024.  Topic 44372 Version 14.0  Release: 32.2.4 - C32.56  © 2024 UpToDate, Inc. and/or its affiliates. All rights reserved.  figure 1: Adult female external genitalia     This drawing shows the different parts of the genitals.  Graphic 20997 Version 9.0  Consumer Information Use and Disclaimer   Disclaimer: This generalized information is a limited summary of diagnosis, treatment, and/or medication information. It is not meant to be comprehensive and should be  used as a tool to help the user understand and/or assess potential diagnostic and treatment options. It does NOT include all information about conditions, treatments, medications, side effects, or risks that may apply to a specific patient. It is not intended to be medical advice or a substitute for the medical advice, diagnosis, or treatment of a health care provider based on the health care provider's examination and assessment of a patient's specific and unique circumstances. Patients must speak with a health care provider for complete information about their health, medical questions, and treatment options, including any risks or benefits regarding use of medications. This information does not endorse any treatments or medications as safe, effective, or approved for treating a specific patient. UpToDate, Inc. and its affiliates disclaim any warranty or liability relating to this information or the use thereof.The use of this information is governed by the Terms of Use, available at https://www.[employer].Sendmail/en/know/clinical-effectiveness-terms. 2024© UpToDate, Inc. and its affiliates and/or licensors. All rights reserved.  Copyright   © 2024 UpToDate, Inc. and/or its affiliates. All rights reserved.  Patient Education     STI Prevention   About this topic   Sexually transmitted infections or STis are infections you catch during sexual contact. This includes vaginal, oral, and anal sex. You may also get it by coming in contact with skin, genitals, mouth, rectum, or body fluids of an infected person. A person with an STI may not have any signs or know they have it. STIs can be very serious and have long-term health effects. STIs can cause cancer, blindness, or not being able to have children.  Bacteria, viruses, or parasites can cause STIs. Bacterial STIs are treated with antibiotics. Only the signs of viral STIs are treated. Common STIs include:  Chlamydia   Gonorrhea  Syphilis  Human immunodeficiency virus  (HIV)  Herpes simplex  Human papillomavirus (HPV)  Hepatitis B and C  Trichomonas  STIs may cause signs like:  Pain when passing urine  Sores or genital warts  Unusual discharge from penis or vagina  Itching on your inner thighs, anus, or genitals  Abnormal menstrual bleeding  Pain or bleeding during sex  Swelling of testicles  Fever  Muscle or joint pain  These signs may come and go. It is important to see your doctor even if you think the signs are gone.  General   Learn about your health, your body, sex, love, and relationships. These are all important parts of sexual health.  Learn about STIs. Find reliable information about STIs.  Know the right names for both male and female body parts.  Find information about the kinds of infections you could get.  Know how STIs spread as well as the signs and treatment.     What will the results be?   You may be able to protect yourself from getting STIs.  You may be able to prevent long-term effects on your health.  If you are pregnant, you may be able to prevent giving your unborn baby a STI.  Will there be any other care needed?   Ask your doctor if there are shots or pills you can take to prevent a STI.  Know your STI status. Get tested and have your partner tested.  What can be done to prevent this health problem?   The only sure way to keep from getting or passing on a sexually transmitted infection is to not have sexual contact with anyone.  Even if you do not have any signs of illness, you may spread an STI.  Having an STI can increase your chances of catching HIV, the virus that causes AIDS.  If you have any type of sexual contact, use latex condoms each time to reduce the spread of infection. Use a condom with each partner every time, from the start of sex to the end.  Avoid contact with any sex partner known to have an infection or who has signs of an infection like genital sores or warts.  Avoid multiple sex partners. A long-term monogamous relationship with a  partner who has tested negative and is known to have no infection is the safest partner.  If you are pregnant, get tested and get prompt treatment for an STI. This will help avoid passing it to your baby.  Avoid using drugs or too much alcohol. Either of these can lead to risky behavior like unprotected sex.  Talk to your partner about STIs before you have sex. Talk about having safe sex and if your relationship is monogamous.  Wash your hands and genitals with soap and water after sex.  See your doctor for regular exams and check-ups for STIs.  Talk to your doctor about available vaccines for prevention of certain STIs.  Last Reviewed Date   2024-02-01  Consumer Information Use and Disclaimer   This generalized information is a limited summary of diagnosis, treatment, and/or medication information. It is not meant to be comprehensive and should be used as a tool to help the user understand and/or assess potential diagnostic and treatment options. It does NOT include all information about conditions, treatments, medications, side effects, or risks that may apply to a specific patient. It is not intended to be medical advice or a substitute for the medical advice, diagnosis, or treatment of a health care provider based on the health care provider's examination and assessment of a patient’s specific and unique circumstances. Patients must speak with a health care provider for complete information about their health, medical questions, and treatment options, including any risks or benefits regarding use of medications. This information does not endorse any treatments or medications as safe, effective, or approved for treating a specific patient. UpToDate, Inc. and its affiliates disclaim any warranty or liability relating to this information or the use thereof. The use of this information is governed by the Terms of Use, available at https://www.[employer].com/en/know/clinical-effectiveness-terms   Copyright    Copyright © 2024 UpToDate, Inc. and its affiliates and/or licensors. All rights reserved.  Patient Education     Lowering Your Risk of Breast Cancer   About this topic   Breast cancer is a serious illness. Breast cancer is when abnormal cells grow and divide more quickly in your breast. These cells form a growth or tumor. The abnormal cells may enter nearby tissue and spread to other parts of the body. It is the type of cancer most often seen in women. Men can have breast cancer, but it is a rare condition.  General   Some things in your life may increase your risk of breast cancer. You may not be able to change some of these. Others you can control.  You are more likely to get breast cancer if you:  Have a mother, sister, or daughter who has had breast cancer  Have used hormones for menopause for more than 5 years  Have had radiation therapy to the breast or chest in the past  Are overweight or do not exercise  Had your first menstrual period before you were 13 years old  Went through menopause after age 57  Have never been pregnant or had your first child after age 35  Have had breast cancer before  Drink alcohol in any form  Have dense breasts  Are older in age  There is no certain way to prevent breast cancer. There are things you can do to lower your chances of having breast cancer.  Keep a healthy weight. Lose weight if you are overweight. Being overweight raises your chances of having breast cancer.  Eat a healthy diet to maintain a healthy weight, such as more fruits, vegetables, and lean cuts of meat. Decrease the amount of saturated fat in your diet.  Exercise. Being active helps you keep a healthy weight.  Limit your alcohol intake or do not drink alcohol. The more alcohol you drink, the higher your risk.  Do not smoke cigarettes. Smoking can increase your risk of many types of cancer.  Breastfeed your baby. This may help protect you. The longer you breastfeed, the more protection you have.  Talk with  your doctor about:  Limiting or stopping hormone therapy.  Taking certain drugs to prevent breast cancer. For women at high risk of having breast cancer, there are a few drugs that may lower your risk.  Surgery to prevent you from having breast cancer if you are very high risk.  When do I need to call the doctor?   Changes in your breasts  A lump or area in your breast that feels different  Discharge from your nipple  Skin on your breast is dimpled or indented  You have questions or concerns about your breasts  Helpful tips   Talk to your doctor about the best kind of breast cancer screening for you.  If you want to do self breast exams, have your doctor show you the right way to do them.  Tell your doctor of any abnormal finding.  Last Reviewed Date   2021-10-04  Consumer Information Use and Disclaimer   This generalized information is a limited summary of diagnosis, treatment, and/or medication information. It is not meant to be comprehensive and should be used as a tool to help the user understand and/or assess potential diagnostic and treatment options. It does NOT include all information about conditions, treatments, medications, side effects, or risks that may apply to a specific patient. It is not intended to be medical advice or a substitute for the medical advice, diagnosis, or treatment of a health care provider based on the health care provider's examination and assessment of a patient’s specific and unique circumstances. Patients must speak with a health care provider for complete information about their health, medical questions, and treatment options, including any risks or benefits regarding use of medications. This information does not endorse any treatments or medications as safe, effective, or approved for treating a specific patient. UpToDate, Inc. and its affiliates disclaim any warranty or liability relating to this information or the use thereof. The use of this information is governed by the  "Terms of Use, available at https://www.[employer].com/en/know/clinical-effectiveness-terms   Copyright   Copyright © 2024 UpToDate, Inc. and its affiliates and/or licensors. All rights reserved.  Patient Education     Hormonal birth control   The Basics   Written by the doctors and editors at [email or web address]   What is hormonal birth control? -- This is any pill, injection, device, or treatment that uses hormones to prevent pregnancy. There are a few different kinds of hormonal birth control. Some contain the hormones estrogen and progestin. Others contain only progestin.  While no birth control works 100 percent perfectly all of the time, hormonal methods work very well to prevent pregnancy. The methods differ in how easy they are to use and their side effects (table 1):   Pills - If you choose birth control pills, you need to take a pill every day. Skipping pills can increase the chance of getting pregnant. Birth control pill packets usually include 4 to 7 days of hormone-free pills each month. You get your period during these hormone-free days. If you prefer not to get a period, you can skip the hormone-free pills and take a hormone pill every day instead. This is called \"continuous dosing.\"  Most birth control pills contain estrogen and progestin, but some contain only progestin. One progestin-only pill (brand name: Opill) is available without a prescription in the [place].   Skin patches - There are a few different patches available (brand names: Xulane, Xafemy, Twirla). You can wear the patch on your shoulder, back, belly, or hip (figure 1). Some can also be worn on the upper arm. You change the patch once a week, and you put it in a new place each time. You typically wear a new patch each week for 3 weeks and then leave the patch off during week 4. You get your period during week 4. Skin patches for birth control contain both estrogen and progestin.   Vaginal rings - This is a flexible ring you put in your vagina " "(sample brand names: Annovera, NuvaRing) (figure 2). The ring releases the hormones estrogen and progestin.  Do not remove the ring when you have sex. You need to check before and after sex to make sure that it is in place. If the ring comes out, make sure that you know how quickly you need to put it back in. This depends on which brand of ring you have. In general, if it comes out for more than a few hours, you need to use another form of birth control to prevent pregnancy.  You typically keep the ring in for 3 weeks. Then, depending on which ring you have, you either throw it away or clean it and store it to put back in later. You do not use the ring during week 4, which is when you have your period.  You can choose to continue using a ring for longer than 3 weeks. If so, you will not have a regular period, although you might have some light bleeding or \"spotting.\"   Injections - If you use hormone injections, you will get a shot in the arm or buttocks every 3 months. Injections for birth control (brand name: Depo-Provera) contain only progestin.   Implants - A birth control implant is a tiny marie that releases hormones in the arm (figure 3). It must be implanted by a doctor or nurse and can stay in the arm for up to 3 years. Implants for birth control (brand name: Nexplanon) contain only progestin.   Hormone-containing intrauterine device (\"IUD\") - This device is placed inside the uterus to prevent pregnancy (figure 4). Some IUDs work by releasing hormones into the body (brand names: Mirena, Liletta, Kyleena, Vicky). Depending on which hormone-releasing IUD you get and your age, it can stay in place for 3 to 8 years. The hormone-releasing IUDs contain the hormone levonorgestrel, which is a progestin.  Hormonal birth control is a safe and reliable way to prevent pregnancy for most people. But it does not protect you from infections that spread through sex (called \"sexually transmitted infections\" or \"sexually " "transmitted diseases\").  Why else might a person use hormonal birth control? -- Hormonal birth control has other benefits besides preventing pregnancy. It can make your periods lighter or more regular. For this reason, it is also often used in the treatment of certain health conditions, including:   Heavy, irregular, or painful periods - Different things can affect your monthly period. Some people also get painful cramps or other symptoms.   Polycystic ovary syndrome (\"PCOS\") - This condition can cause irregular periods, acne, extra facial hair, or hair loss from the head.   Menstrual migraines - These are migraine headaches that are triggered by hormone changes around the monthly period. Hormonal birth control might be an option for treatment, as long as you do not get migraines with an \"aura.\" An aura is a symptom or feeling that happens before or during the headache. For example, some people see flashing lights, bright spots, or zig-zag lines, or lose part of their vision.  If you have any of these conditions, your doctor or nurse might suggest the pill or another form of hormonal birth control. Do not try using birth control to treat a health condition without talking to your doctor or nurse first.  How do I choose the right hormonal birth control for me? -- Work with your doctor or nurse to choose the best option for you. Think about how likely you are to use each method the right way. Can you remember to take a pill every day? Can you remember to change a patch once a week? Long-acting methods (IUD, implant) are the most convenient because they work for 3 to 10 years, depending on the method. The injection, which works for 3 months, might be more convenient than the pill, patch, or ring. Also, ask your doctor how the method you are thinking about will affect your period. The table has a list of side effects and risks for each of the different forms (table 1).  Is hormonal birth control safe for everyone? -- " "No. Some people should not use estrogen-containing hormonal birth control. This includes those who:   Are age 35 or older and smoke cigarettes - These things increase your risk for heart attacks and strokes.   Could possibly be pregnant - Before prescribing hormonal birth control, your doctor or nurse will ask questions to make sure that you are not pregnant. You might need to take a pregnancy test to confirm this.   Have had blood clots or a stroke in the past   Are being treated for breast cancer, or have had breast cancer before   Have some types of liver disease - Hormonal birth control can make some types of liver disease worse.   Have some types of heart disease   Get the type of migraine headaches that cause vision or hearing problems  If you have high blood pressure, you can still use hormonal birth control. But your blood pressure needs to be well controlled and regularly checked by a doctor.  Many people who can't take estrogen-containing hormonal birth control can take other kinds of hormonal birth control that contain only progestin. Or they can use methods that do not contain hormones.  What if I take medicines besides birth control? -- Some medicines can affect how well hormonal birth control works. These include:   Some medicines used to prevent seizures (called \"anticonvulsants\")   Certain antibiotics used to treat tuberculosis (rifampin and rifabutin)   Sree's Wort (an herbal medicine for depression)  If you take any of these medicines, talk to your doctor about how to handle birth control. Also, if you already take hormonal birth control, tell any doctor or nurse who might be prescribing medicines for you.  What if I forget to use my hormonal birth control? -- If you have sex and forgot to use your birth control, you can take emergency contraception to reduce your risk of pregnancy. Some forms of emergency contraception require a prescription, but you can buy others in a pharmacy. The IUD is " the most effective method of emergency contraception, but requires a doctor or nurse to insert it. If you need to use emergency contraception, do it as soon as possible after sex.  All topics are updated as new evidence becomes available and our peer review process is complete.  This topic retrieved from [employer] on: Mar 20, 2024.  Topic 64625 Version 21.0  Release: 32.2.4 - C32.78  © 2024 UpToDate, Inc. and/or its affiliates. All rights reserved.  table 1: Hormonal birth control  Method  Using the method  Some side effects and risks    Implantable marie   Hormones it contains: Progestin  Expected pregnancies per 100 people in first year of use: Less than 1 Must be inserted by a doctor.  Nothing to do or remember.  Lasts up to 3 years. Most common side effects:   Bleeding between periods  Changes in periods  Other possible side effects:  Headache  Acne  Sore breasts  Weight gain  Mood changes   IUD with progestin   Hormones it contains: Progestin  Expected pregnancies per 100 people in first year of use: Less than 1 Must be inserted by a doctor.  Nothing to do or remember.  Lasts 3 to 8 years depending on type. Most common side effects:   Bleeding between periods  Regular periods may stop  Other possible side effects:  Cramps  Potential but uncommon risks:  IUD can slip out  IUD can damage the uterus  Insertion of IUD can lead to a type of infection that can make it hard to get pregnant after the IUD is removed   Shot/injection   Hormones it contains: Progestin  Expected pregnancies per 100 people in first year of use: 4 to 7 Doctor or nurse must give you a shot every 3 months. Most common side effects:   Bleeding between periods  Regular periods may stop  Other possible side effects:  Temporary bone thinning  Weight gain  Mood changes  Hair loss or increased hair on face or body  Sore breasts  Headache   Progestin-only pill   Hormones it contains: Progestin  Expected pregnancies per 100 people in first year of use: 4  to 7 You must swallow a pill at the same time every day. Most common side effects:   Bleeding between periods (this is more common with progestin-only pills than with combined estrogen-progestin pills)  Other possible side effects:  Sore breasts  Acne   Combination estrogen-progestin pill   Hormones it contains: Progestin and estrogen  Expected pregnancies per 100 people in first year of use: 4 to 7 You must swallow a pill every day. Most common side effects:   Bleeding between periods  Decreased bleeding during period  Other possible side effects:  Nausea  Changes in mood  Rare but serious risks include:  Heart attack  Stroke  Blood clots  High blood pressure  Liver tumors  Gallstones  Yellowing of the skin or eyes (jaundice)   Patch   Hormones it contains: Progestin and estrogen  Expected pregnancies per 100 people in first year of use: 4 to 7 You must wear a patch on your skin all of the time for 3 weeks. Every week, you change the patch. During the 4th week, you do not use a patch. Side effects and risks are similar to those of combined estrogen-progestin pills, but the patch causes higher average levels of estrogen than most pills. This may increase the risk of blood clots.  Other possible side effects:  Skin irritation where the patch is applied   Vaginal ring   Hormones it contains: Progestin and estrogen  Expected pregnancies per 100 people in first year of use: 4 to 7 You must put the ring into your vagina and leave it in for 3 weeks. During the 4th week, you do not use a ring. Side effects and risks are similar to those of the combined estrogen-progestin pill. (Breast soreness and nausea may be less than with the pill.)  Other possible side effects:  Vaginal discharge or irritation   The methods listed here all require a prescription. This table applies to people without medical problems, such as a history of cancer, blood clots, or liver disease. If you have any medical problems, ask your doctor or nurse  whether you should avoid any type of hormonal birth control. If you are breastfeeding, tell your doctor or nurse when you choose birth control.  IUD: intrauterine device.  Graphic 69827 Version 8.0  figure 1: Birth control skin patch     This picture shows a birth control patch on the upper back. You can wear the patch on your shoulder, back, belly, or hip. One type of patch can also be worn on the upper arm.  The skin patch delivers hormones into the body that help prevent pregnancy.  Graphic 52275 Version 9.0  figure 2: Vaginal ring     This picture shows the birth control ring. It is a flexible ring that you put in your vagina for 3 weeks at a time. It delivers hormones into the body that help prevent pregnancy.  Graphic 79991 Version 5.0  figure 3: Birth control implant     This picture shows the shape and size of the birth control implant. It is implanted into the upper arm, where it releases hormones that help prevent pregnancy.  Graphic 17711 Version 5.0  figure 4: IUDs     This picture shows 2 types of IUDs. There are different IUDs available. They are placed inside the uterus to help prevent pregnancy. Some hormonal IUDs can help reduce menstrual bleeding. The copper IUD can actually make menstrual bleeding heavier.  Graphic 69793 Version 15.0  Consumer Information Use and Disclaimer   Disclaimer: This generalized information is a limited summary of diagnosis, treatment, and/or medication information. It is not meant to be comprehensive and should be used as a tool to help the user understand and/or assess potential diagnostic and treatment options. It does NOT include all information about conditions, treatments, medications, side effects, or risks that may apply to a specific patient. It is not intended to be medical advice or a substitute for the medical advice, diagnosis, or treatment of a health care provider based on the health care provider's examination and assessment of a patient's specific and  unique circumstances. Patients must speak with a health care provider for complete information about their health, medical questions, and treatment options, including any risks or benefits regarding use of medications. This information does not endorse any treatments or medications as safe, effective, or approved for treating a specific patient. UpToDate, Inc. and its affiliates disclaim any warranty or liability relating to this information or the use thereof.The use of this information is governed by the Terms of Use, available at https://www.woltersYooliuwer.com/en/know/clinical-effectiveness-terms. 2024© UpToDate, Inc. and its affiliates and/or licensors. All rights reserved.  Copyright   © 2024 UpToDate, Inc. and/or its affiliates. All rights reserved.

## 2025-06-24 NOTE — PROGRESS NOTES
Monitor: The problem is unchanged.  Evaluation: No labs/tests required today.  Assessment/Treatment:  Continue current treatment/monitoring regimen.   Name: Amanda Low      : 2000      MRN: [id number]  Encounter Provider: JOHN Garcia  Encounter Date: 2025   Encounter department: [facility] [place]  :  Assessment & Plan  Encounter for annual routine gynecological examination  Pap every 3 years if normal.  STI testing as indicated.  Exercise most days of week-minimum of 150-300 minutes per week.   Obtain appropriate diet and hydration.   Calcium 1000mg (in divided doses-max 600 mg at one time) + 600 vit D daily.  Condom use when sexually active for sexually transmitted infection prevention.   Gardasil series received.    Call your insurance company to verify coverage prior to completing any ordered tests.   Return to office in one year or sooner, if needed.          Candida infection of genital region  Advised probiotics, watching her carb intake and boric acid as needed.  Orders:    terconazole (TERAZOL 7) 0.4 % vaginal cream; Insert 1 applicator into the vagina daily at bedtime for 7 days    Screening for STDs (sexually transmitted diseases)    Orders:    Hepatitis B surface antigen; Future    Hepatitis C antibody; Future    HIV 1/2 AG/AB w Reflex [facility] for 2 yr old and above; Future    RPR-Syphilis Screening (Total Syphilis IGG/IGM); Future    Chlamydia/GC amplified DNA by PCR        History of Present Illness   HPI  Amanda Low is a 24 y.o. female who presents for annual.  She denies any issues with bleeding or her menses. She reports regular cycles every 20 days that last 6 days. She had a GC/CT done today. She denies vaginal issues but is itchy lately. Denies pelvic pain. Patient again declines a BCM, changed her mind about the Paragard. Gardasil series completed 0628-9013. Sexually active without any concerns. Monogamous x 1 year. She sees Urology for sepsis f/u and kidney stone back in 2024     LMP: 25   Concerns: Has vaginal itching and some burning that comes and goes.  "Also has pain with intercourse that started a few months ago. Denies bleeding with intercourse.   Sex active: yes, would like testing   Birth control: condoms   Last pap: 6/20/24-neg   HPV vaccines: UTD   Aunt with breast cancer     At [facility] for radiology, will apply soon  Also works at [email or web address] Part time      Review of Systems   Constitutional:  Negative for chills, fatigue, fever and unexpected weight change.   Respiratory:  Negative for shortness of breath.    Gastrointestinal:  Negative for anal bleeding, blood in stool, constipation and diarrhea.   Genitourinary:  Negative for difficulty urinating, dysuria and hematuria.        Sees Urology for sepsis f/u and kidney stone back in Jan 2024    Allergic/Immunologic: Positive for environmental allergies.   Neurological:  Negative for weakness.   Psychiatric/Behavioral:  Negative for agitation, behavioral problems and confusion.           Objective   /78 (BP Location: Right arm, Patient Position: Sitting, Cuff Size: Adult)   Ht 5' 7.5\" (1.715 m)   Wt 87.2 kg (192 lb 3.2 oz)   LMP 06/20/2025 (Exact Date)   BMI 29.66 kg/m²      Physical Exam  Constitutional:       General: She is not in acute distress.     Appearance: She is well-developed.   HENT:      Head: Normocephalic.   Pulmonary:      Effort: Pulmonary effort is normal.   Chest:   Breasts:     Breasts are symmetrical.      Right: No inverted nipple, mass, nipple discharge, skin change or tenderness.      Left: No inverted nipple, mass, nipple discharge, skin change or tenderness.   Abdominal:      Palpations: Abdomen is soft.   Genitourinary:     General: Normal vulva.      Exam position: Supine.      Pubic Area: No rash.       Patrick stage (genital): 5.      Labia:         Right: No rash, tenderness, lesion or injury.         Left: No rash, tenderness, lesion or injury.       Urethra: No urethral pain or urethral lesion.      Vagina: No signs of injury and foreign body. No vaginal discharge, erythema " or tenderness.      Cervix: No cervical motion tenderness, discharge or friability.      Uterus: Not deviated, not enlarged, not fixed and not tender.       Adnexa:         Right: No mass, tenderness or fullness.          Left: No mass, tenderness or fullness.        Comments: Mild yeasty discharge noted    Musculoskeletal:      Cervical back: Normal range of motion and neck supple.   Lymphadenopathy:      Lower Body: No right inguinal adenopathy. No left inguinal adenopathy.

## 2025-06-25 ENCOUNTER — TELEPHONE (OUTPATIENT)
Age: 25
End: 2025-06-25

## 2025-06-25 DIAGNOSIS — N20.0 CALCULUS OF KIDNEY: Primary | ICD-10-CM

## 2025-06-25 NOTE — TELEPHONE ENCOUNTER
Follow Up:     Pt under care of:  dominick in the OR only  Office Location:     Last Seen (include Follow Up recommendations of last visit- see Office Visit - Instructions):   PT has never been seen in the office only OR in 2024    Pt calling due to: does pt need to obtain any imaging for upcoming scheduled appt for kidney stones      Pt can be reached at: [phone] vm can be left if needed      Appointment Details: Date 8/14/25   Time:9    Location: [place]   Provider:  kim      Does the appointment need further review? (Reason

## 2025-06-25 NOTE — TELEPHONE ENCOUNTER
I called patient and left message stating that we want her to get an ultrasound prior to her scheduled appointment. I provided central scheduling number in message.

## 2025-06-26 LAB
C TRACH DNA SPEC QL NAA+PROBE: NEGATIVE
N GONORRHOEA DNA SPEC QL NAA+PROBE: NEGATIVE

## 2025-07-14 ENCOUNTER — HOSPITAL ENCOUNTER (OUTPATIENT)
Dept: ULTRASOUND IMAGING | Facility: HOSPITAL | Age: 25
Discharge: HOME | End: 2025-07-14
Payer: COMMERCIAL

## 2025-07-14 DIAGNOSIS — N20.0 CALCULUS OF KIDNEY: ICD-10-CM

## 2025-07-14 PROCEDURE — 76775 US EXAM ABDO BACK WALL LIM: CPT

## 2025-07-22 ENCOUNTER — OFFICE VISIT (OUTPATIENT)
Dept: UROLOGY | Facility: CLINIC | Age: 25
End: 2025-07-22
Attending: FAMILY MEDICINE
Payer: COMMERCIAL

## 2025-07-22 VITALS
HEART RATE: 55 BPM | BODY MASS INDEX: 28.79 KG/M2 | HEIGHT: 68 IN | DIASTOLIC BLOOD PRESSURE: 84 MMHG | SYSTOLIC BLOOD PRESSURE: 130 MMHG | WEIGHT: 190 LBS | TEMPERATURE: 97.6 F | OXYGEN SATURATION: 100 %

## 2025-07-22 DIAGNOSIS — N28.89 PELVIECTASIS: ICD-10-CM

## 2025-07-22 DIAGNOSIS — N20.0 NEPHROLITHIASIS: Primary | ICD-10-CM

## 2025-07-22 LAB
SL AMB  POCT GLUCOSE, UA: NORMAL
SL AMB LEUKOCYTE ESTERASE,UA: NORMAL
SL AMB POCT BILIRUBIN,UA: NORMAL
SL AMB POCT BLOOD,UA: NORMAL
SL AMB POCT CLARITY,UA: CLEAR
SL AMB POCT COLOR,UA: YELLOW
SL AMB POCT KETONES,UA: NORMAL
SL AMB POCT NITRITE,UA: NORMAL
SL AMB POCT PH,UA: 5
SL AMB POCT SPECIFIC GRAVITY,UA: 1.02
SL AMB POCT URINE PROTEIN: NORMAL
SL AMB POCT UROBILINOGEN: 0.2

## 2025-07-22 PROCEDURE — 99214 OFFICE O/P EST MOD 30 MIN: CPT | Performed by: PHYSICIAN ASSISTANT

## 2025-07-22 PROCEDURE — 81002 URINALYSIS NONAUTO W/O SCOPE: CPT | Performed by: PHYSICIAN ASSISTANT

## 2025-07-22 NOTE — PROGRESS NOTES
"Name: Amanda Low      : 2000      MRN: [id number]  Encounter Provider: Sarah Schnitzler, PA-C  Encounter Date: 2025   Encounter department: [facility] [place]  :  Assessment & Plan  Nephrolithiasis  - S/p right ureteroscopy basket stone extraction on 24  - US kidney bladder from 25 - Right renal pelvis mildly dilated at 1.1 cm otherwise no evidence of hydronephrosis. Lobular contours of the kidneys bilaterally suggesting scarring. Small nonobstructing right renal calculi.  - Urine dip today negative  - Discussed further work-up of right peliviectasis with CT urogram and BMP which she is agreeable to as last week was having intermittent right flank \"pressure\". Should this be negative for obstruction, will follow up in 1 year with US and KUB and monitor stones. Will call with results.   - Nephrology referral provided for metabolic evaluation. Recommend adequate hydration and dietary modifications for stone prevention   Orders:    POCT urine dip    Ambulatory referral to Urology    Ambulatory Referral to Nephrology; Future    Pelviectasis    Orders:    CT renal protocol; Future    Basic metabolic panel; Future        History of Present Illness   Amanda Low is a 24 y.o. female who presents for kidney stone follow up. She was seen inpatient initially in 2024 with sepsis due to right ureteral stone and underwent ureteral stent insertion. She underwent definitive right ureteroscopy on 24. She was lost to follow up thereafter. She denies any episodes of stone passage since last encounter. No history of stones prior to last year. She notes occasionally has been having some \"pressure\" to her right flank area. No urinary complaints.         Review of Systems   Constitutional:  Negative for chills and fever.   Respiratory:  Negative for shortness of breath.    Cardiovascular:  Negative for chest pain.   Gastrointestinal:  Negative for abdominal pain. " "  Genitourinary:  Negative for difficulty urinating, dysuria, flank pain, frequency, hematuria, pelvic pain and urgency.   Neurological:  Negative for dizziness.        Objective   /84 (BP Location: Left arm, Patient Position: Sitting, Cuff Size: Standard)   Pulse 55   Temp 97.6 °F (36.4 °C)   Ht 5' 7.5\" (1.715 m)   Wt 86.2 kg (190 lb)   LMP 06/20/2025   SpO2 100%   BMI 29.32 kg/m²     Physical Exam  Constitutional:       Appearance: Normal appearance.   HENT:      Head: Normocephalic and atraumatic.      Right Ear: External ear normal.      Left Ear: External ear normal.      Nose: Nose normal.     Eyes:      General: No scleral icterus.     Conjunctiva/sclera: Conjunctivae normal.       Cardiovascular:      Pulses: Normal pulses.   Pulmonary:      Effort: Pulmonary effort is normal.     Musculoskeletal:         General: Normal range of motion.      Cervical back: Normal range of motion.     Neurological:      General: No focal deficit present.      Mental Status: She is alert and oriented to person, place, and time.     Psychiatric:         Mood and Affect: Mood normal.         Behavior: Behavior normal.         Thought Content: Thought content normal.         Judgment: Judgment normal.          Results   No results found for: \"PSA\"  Lab Results   Component Value Date    CALCIUM 8.1 (L) 01/04/2024    K 3.8 01/04/2024    CO2 20 (L) 01/04/2024     (H) 01/04/2024    BUN 14 01/04/2024    CREATININE 0.78 01/04/2024     Lab Results   Component Value Date    WBC 9.11 01/11/2024    HGB 12.3 01/11/2024    HCT 38.7 01/11/2024    MCV 86 01/11/2024     01/11/2024       Office Urine Dip  Recent Results (from the past hour)   POCT urine dip    Collection Time: 07/22/25  1:58 PM   Result Value Ref Range    LEUKOCYTE ESTERASE,UA -     NITRITE,UA -     SL AMB POCT UROBILINOGEN 0.2     POCT URINE PROTEIN -      PH,UA 5.0     BLOOD,UA -     SPECIFIC GRAVITY,UA 1.025     KETONES,UA -     BILIRUBIN,UA -     " GLUCOSE, UA -      COLOR,UA yellow     CLARITY,UA clear

## 2025-07-28 ENCOUNTER — TELEPHONE (OUTPATIENT)
Dept: NEPHROLOGY | Facility: CLINIC | Age: 25
End: 2025-07-28

## 2025-08-07 ENCOUNTER — APPOINTMENT (OUTPATIENT)
Dept: LAB | Facility: CLINIC | Age: 25
End: 2025-08-07
Payer: COMMERCIAL

## 2025-08-08 ENCOUNTER — HOSPITAL ENCOUNTER (OUTPATIENT)
Dept: CT IMAGING | Facility: HOSPITAL | Age: 25
End: 2025-08-08
Attending: PHYSICIAN ASSISTANT
Payer: COMMERCIAL

## 2025-08-12 ENCOUNTER — TELEPHONE (OUTPATIENT)
Dept: NEPHROLOGY | Facility: CLINIC | Age: 25
End: 2025-08-12

## 2025-08-19 ENCOUNTER — RESULTS FOLLOW-UP (OUTPATIENT)
Dept: UROLOGY | Facility: CLINIC | Age: 25
End: 2025-08-19

## 2025-08-19 DIAGNOSIS — N20.0 NEPHROLITHIASIS: Primary | ICD-10-CM

## (undated) DEVICE — CATH URETERAL 5FR X 70 CM FLEX TIP POLYUR BARD

## (undated) DEVICE — SINGLE-USE DIGITAL FLEXIBLE URETEROSCOPE: Brand: APTRA

## (undated) DEVICE — EXIDINE 4 PCT

## (undated) DEVICE — STERILE SURGICAL LUBRICANT,  TUBE: Brand: SURGILUBE

## (undated) DEVICE — SCD SEQUENTIAL COMPRESSION COMFORT SLEEVE MEDIUM KNEE LENGTH: Brand: KENDALL SCD

## (undated) DEVICE — PREMIUM DRY TRAY LF: Brand: MEDLINE INDUSTRIES, INC.

## (undated) DEVICE — UROLOGIC DRAIN BAG: Brand: UNBRANDED

## (undated) DEVICE — BASKET SPECIMEN RETRIVAL 1.9FR 120CM

## (undated) DEVICE — INLAY OPTIMA URETERAL STENT W/O GUIDEWIRE
Type: IMPLANTABLE DEVICE | Site: URETER | Status: NON-FUNCTIONAL
Brand: BARD® INLAY OPTIMA® URETERAL STENT

## (undated) DEVICE — PACK TUR

## (undated) DEVICE — TUBING SUCTION 5MM X 12 FT

## (undated) DEVICE — CHLORHEXIDINE 4PCT 4 OZ

## (undated) DEVICE — 3M™ TEGADERM™ TRANSPARENT FILM DRESSING FRAME STYLE, 1624W, 2-3/8 IN X 2-3/4 IN (6 CM X 7 CM), 100/CT 4CT/CASE: Brand: 3M™ TEGADERM™

## (undated) DEVICE — Device

## (undated) DEVICE — GUIDEWIRE STRGHT TIP 0.035 IN  SOLO PLUS

## (undated) DEVICE — SPECIMEN CONTAINER STERILE PEEL PACK

## (undated) DEVICE — GLOVE SRG BIOGEL 8

## (undated) DEVICE — ENDOSCOPIC VALVE WITH ADAPTER.: Brand: SURSEAL® II